# Patient Record
Sex: FEMALE | Race: AMERICAN INDIAN OR ALASKA NATIVE | NOT HISPANIC OR LATINO | Employment: OTHER | URBAN - METROPOLITAN AREA
[De-identification: names, ages, dates, MRNs, and addresses within clinical notes are randomized per-mention and may not be internally consistent; named-entity substitution may affect disease eponyms.]

---

## 2017-01-01 ENCOUNTER — GENERIC CONVERSION - ENCOUNTER (OUTPATIENT)
Dept: OTHER | Facility: OTHER | Age: 77
End: 2017-01-01

## 2017-01-01 ENCOUNTER — APPOINTMENT (OUTPATIENT)
Dept: LAB | Facility: CLINIC | Age: 77
End: 2017-01-01
Payer: MEDICARE

## 2017-01-01 ENCOUNTER — ALLSCRIPTS OFFICE VISIT (OUTPATIENT)
Dept: OTHER | Facility: OTHER | Age: 77
End: 2017-01-01

## 2017-01-01 ENCOUNTER — TRANSCRIBE ORDERS (OUTPATIENT)
Dept: ADMINISTRATIVE | Facility: HOSPITAL | Age: 77
End: 2017-01-01

## 2017-01-01 ENCOUNTER — HOSPITAL ENCOUNTER (OUTPATIENT)
Dept: MAMMOGRAPHY | Facility: CLINIC | Age: 77
Discharge: HOME | End: 2017-08-28
Payer: MEDICARE

## 2017-01-01 ENCOUNTER — HOSPITAL ENCOUNTER (EMERGENCY)
Facility: HOSPITAL | Age: 77
Discharge: HOME | End: 2017-09-11
Attending: EMERGENCY MEDICINE | Admitting: EMERGENCY MEDICINE
Payer: MEDICARE

## 2017-01-01 ENCOUNTER — HOSPITAL ENCOUNTER (OUTPATIENT)
Dept: MAMMOGRAPHY | Facility: CLINIC | Age: 77
Discharge: HOME | End: 2017-10-02
Payer: MEDICARE

## 2017-01-01 VITALS
WEIGHT: 288.14 LBS | SYSTOLIC BLOOD PRESSURE: 141 MMHG | TEMPERATURE: 97.9 F | DIASTOLIC BLOOD PRESSURE: 74 MMHG | RESPIRATION RATE: 16 BRPM | BODY MASS INDEX: 46.31 KG/M2 | HEART RATE: 54 BPM | OXYGEN SATURATION: 98 % | HEIGHT: 66 IN

## 2017-01-01 DIAGNOSIS — I10 ESSENTIAL (PRIMARY) HYPERTENSION: ICD-10-CM

## 2017-01-01 DIAGNOSIS — R74.8 ABNORMAL LEVELS OF OTHER SERUM ENZYMES: ICD-10-CM

## 2017-01-01 DIAGNOSIS — Z12.31 ENCOUNTER FOR SCREENING MAMMOGRAM FOR MALIGNANT NEOPLASM OF BREAST: ICD-10-CM

## 2017-01-01 DIAGNOSIS — R21 RASH AND NONSPECIFIC SKIN ERUPTION: Primary | ICD-10-CM

## 2017-01-01 DIAGNOSIS — E78.5 HYPERLIPIDEMIA: ICD-10-CM

## 2017-01-01 DIAGNOSIS — Z12.31 ENCOUNTER FOR MAMMOGRAM TO ESTABLISH BASELINE MAMMOGRAM: ICD-10-CM

## 2017-01-01 DIAGNOSIS — Z12.31 ENCOUNTER FOR MAMMOGRAM TO ESTABLISH BASELINE MAMMOGRAM: Primary | ICD-10-CM

## 2017-01-01 DIAGNOSIS — L23.7 POISON IVY DERMATITIS: ICD-10-CM

## 2017-01-01 LAB
ALBUMIN SERPL BCP-MCNC: 3.5 G/DL (ref 3.5–5)
ALP SERPL-CCNC: 96 U/L (ref 46–116)
ALT SERPL W P-5'-P-CCNC: 24 U/L (ref 12–78)
ANION GAP SERPL CALCULATED.3IONS-SCNC: 7 MMOL/L (ref 4–13)
ANION GAP SERPL CALCULATED.3IONS-SCNC: 7 MMOL/L (ref 4–13)
AST SERPL W P-5'-P-CCNC: 24 U/L (ref 5–45)
BILIRUB SERPL-MCNC: 0.66 MG/DL (ref 0.2–1)
BUN SERPL-MCNC: 29 MG/DL (ref 5–25)
BUN SERPL-MCNC: 38 MG/DL (ref 5–25)
CALCIUM SERPL-MCNC: 8.9 MG/DL (ref 8.3–10.1)
CALCIUM SERPL-MCNC: 8.9 MG/DL (ref 8.3–10.1)
CHLORIDE SERPL-SCNC: 105 MMOL/L (ref 100–108)
CHLORIDE SERPL-SCNC: 106 MMOL/L (ref 100–108)
CHOLEST SERPL-MCNC: 155 MG/DL (ref 50–200)
CK MB SERPL-MCNC: 2.4 NG/ML (ref 0–5)
CK MB SERPL-MCNC: <1 % (ref 0–2.5)
CK SERPL-CCNC: 288 U/L (ref 26–192)
CO2 SERPL-SCNC: 27 MMOL/L (ref 21–32)
CO2 SERPL-SCNC: 28 MMOL/L (ref 21–32)
CREAT SERPL-MCNC: 1.28 MG/DL (ref 0.6–1.3)
CREAT SERPL-MCNC: 1.3 MG/DL (ref 0.6–1.3)
GFR SERPL CREATININE-BSD FRML MDRD: 40 ML/MIN/1.73SQ M
GFR SERPL CREATININE-BSD FRML MDRD: 41 ML/MIN/1.73SQ M
GLUCOSE P FAST SERPL-MCNC: 91 MG/DL (ref 65–99)
GLUCOSE P FAST SERPL-MCNC: 97 MG/DL (ref 65–99)
HDLC SERPL-MCNC: 63 MG/DL (ref 40–60)
LDLC SERPL CALC-MCNC: 84 MG/DL (ref 0–100)
POTASSIUM SERPL-SCNC: 3.8 MMOL/L (ref 3.5–5.3)
POTASSIUM SERPL-SCNC: 4.2 MMOL/L (ref 3.5–5.3)
PROT SERPL-MCNC: 7.5 G/DL (ref 6.4–8.2)
SODIUM SERPL-SCNC: 139 MMOL/L (ref 136–145)
SODIUM SERPL-SCNC: 141 MMOL/L (ref 136–145)
TRIGL SERPL-MCNC: 40 MG/DL

## 2017-01-01 PROCEDURE — 80053 COMPREHEN METABOLIC PANEL: CPT

## 2017-01-01 PROCEDURE — 77063 BREAST TOMOSYNTHESIS BI: CPT

## 2017-01-01 PROCEDURE — 82550 ASSAY OF CK (CPK): CPT

## 2017-01-01 PROCEDURE — 99282 EMERGENCY DEPT VISIT SF MDM: CPT

## 2017-01-01 PROCEDURE — 80048 BASIC METABOLIC PNL TOTAL CA: CPT

## 2017-01-01 PROCEDURE — 80061 LIPID PANEL: CPT

## 2017-01-01 PROCEDURE — 77080 DXA BONE DENSITY AXIAL: CPT

## 2017-01-01 PROCEDURE — G0202 SCR MAMMO BI INCL CAD: HCPCS

## 2017-01-01 PROCEDURE — 36415 COLL VENOUS BLD VENIPUNCTURE: CPT

## 2017-01-01 PROCEDURE — 82553 CREATINE MB FRACTION: CPT

## 2017-01-01 RX ORDER — ASPIRIN 81 MG/1
81 TABLET ORAL DAILY
COMMUNITY

## 2017-01-01 RX ORDER — CHLORAL HYDRATE 500 MG
1000 CAPSULE ORAL 2 TIMES DAILY
COMMUNITY

## 2017-01-01 RX ORDER — FUROSEMIDE 40 MG/1
40 TABLET ORAL 2 TIMES DAILY
COMMUNITY
End: 2018-01-01 | Stop reason: SDUPTHER

## 2017-01-01 RX ORDER — METOPROLOL SUCCINATE 100 MG/1
100 TABLET, EXTENDED RELEASE ORAL DAILY
COMMUNITY
End: 2018-01-01 | Stop reason: SDUPTHER

## 2017-01-01 RX ORDER — MULTIVITAMIN
1 TABLET ORAL DAILY
COMMUNITY

## 2017-01-01 RX ORDER — MELATONIN
1000 DAILY
COMMUNITY

## 2017-01-01 RX ORDER — VALSARTAN AND HYDROCHLOROTHIAZIDE 80; 12.5 MG/1; MG/1
1 TABLET, FILM COATED ORAL DAILY
COMMUNITY

## 2017-01-01 RX ORDER — ATORVASTATIN CALCIUM 40 MG/1
20 TABLET, FILM COATED ORAL DAILY
COMMUNITY
End: 2018-01-01 | Stop reason: DRUGHIGH

## 2017-02-03 ENCOUNTER — GENERIC CONVERSION - ENCOUNTER (OUTPATIENT)
Dept: OTHER | Facility: OTHER | Age: 77
End: 2017-02-03

## 2017-02-10 ENCOUNTER — ALLSCRIPTS OFFICE VISIT (OUTPATIENT)
Dept: OTHER | Facility: OTHER | Age: 77
End: 2017-02-10

## 2017-02-23 ENCOUNTER — ALLSCRIPTS OFFICE VISIT (OUTPATIENT)
Dept: OTHER | Facility: OTHER | Age: 77
End: 2017-02-23

## 2017-04-12 ENCOUNTER — ALLSCRIPTS OFFICE VISIT (OUTPATIENT)
Dept: OTHER | Facility: OTHER | Age: 77
End: 2017-04-12

## 2017-05-15 ENCOUNTER — GENERIC CONVERSION - ENCOUNTER (OUTPATIENT)
Dept: OTHER | Facility: OTHER | Age: 77
End: 2017-05-15

## 2017-05-25 ENCOUNTER — APPOINTMENT (OUTPATIENT)
Dept: LAB | Facility: CLINIC | Age: 77
End: 2017-05-25
Payer: MEDICARE

## 2017-05-25 ENCOUNTER — GENERIC CONVERSION - ENCOUNTER (OUTPATIENT)
Dept: OTHER | Facility: OTHER | Age: 77
End: 2017-05-25

## 2017-05-25 DIAGNOSIS — E78.5 HYPERLIPIDEMIA: ICD-10-CM

## 2017-05-25 DIAGNOSIS — I10 ESSENTIAL (PRIMARY) HYPERTENSION: ICD-10-CM

## 2017-05-25 LAB
ALBUMIN SERPL BCP-MCNC: 3.4 G/DL (ref 3.5–5)
ALP SERPL-CCNC: 96 U/L (ref 46–116)
ALT SERPL W P-5'-P-CCNC: 27 U/L (ref 12–78)
ANION GAP SERPL CALCULATED.3IONS-SCNC: 5 MMOL/L (ref 4–13)
AST SERPL W P-5'-P-CCNC: 22 U/L (ref 5–45)
BILIRUB SERPL-MCNC: 0.57 MG/DL (ref 0.2–1)
BUN SERPL-MCNC: 26 MG/DL (ref 5–25)
CALCIUM SERPL-MCNC: 8.8 MG/DL (ref 8.3–10.1)
CHLORIDE SERPL-SCNC: 105 MMOL/L (ref 100–108)
CHOLEST SERPL-MCNC: 254 MG/DL (ref 50–200)
CK MB SERPL-MCNC: 2 NG/ML (ref 0–5)
CK MB SERPL-MCNC: <1 % (ref 0–2.5)
CK SERPL-CCNC: 256 U/L (ref 26–192)
CO2 SERPL-SCNC: 30 MMOL/L (ref 21–32)
CREAT SERPL-MCNC: 1.17 MG/DL (ref 0.6–1.3)
GFR SERPL CREATININE-BSD FRML MDRD: 45.1 ML/MIN/1.73SQ M
GLUCOSE P FAST SERPL-MCNC: 96 MG/DL (ref 65–99)
HDLC SERPL-MCNC: 69 MG/DL (ref 40–60)
LDLC SERPL CALC-MCNC: 172 MG/DL (ref 0–100)
POTASSIUM SERPL-SCNC: 3.9 MMOL/L (ref 3.5–5.3)
PROT SERPL-MCNC: 7.5 G/DL (ref 6.4–8.2)
SODIUM SERPL-SCNC: 140 MMOL/L (ref 136–145)
TRIGL SERPL-MCNC: 66 MG/DL

## 2017-05-25 PROCEDURE — 82553 CREATINE MB FRACTION: CPT

## 2017-05-25 PROCEDURE — 82550 ASSAY OF CK (CPK): CPT

## 2017-05-25 PROCEDURE — 36415 COLL VENOUS BLD VENIPUNCTURE: CPT

## 2017-05-25 PROCEDURE — 80053 COMPREHEN METABOLIC PANEL: CPT

## 2017-05-25 PROCEDURE — 80061 LIPID PANEL: CPT

## 2017-06-12 ENCOUNTER — ALLSCRIPTS OFFICE VISIT (OUTPATIENT)
Dept: OTHER | Facility: OTHER | Age: 77
End: 2017-06-12

## 2017-06-12 DIAGNOSIS — Z13.820 ENCOUNTER FOR SCREENING FOR OSTEOPOROSIS: ICD-10-CM

## 2017-06-15 ENCOUNTER — GENERIC CONVERSION - ENCOUNTER (OUTPATIENT)
Dept: OTHER | Facility: OTHER | Age: 77
End: 2017-06-15

## 2017-06-23 ENCOUNTER — ALLSCRIPTS OFFICE VISIT (OUTPATIENT)
Dept: OTHER | Facility: OTHER | Age: 77
End: 2017-06-23

## 2017-09-11 NOTE — DISCHARGE INSTRUCTIONS
Apply the medicated cream as needed  Continue to use the apple cider vinegar as it helps  Take Benadryl as needed for itching, as per the instructions  Try not to scratch at the rash  Follow up with your primary care doctor for further evaluation  Contact Dermatitis   WHAT YOU NEED TO KNOW:   Contact dermatitis is a skin rash  It develops when you touch something that irritates your skin or causes an allergic reaction  DISCHARGE INSTRUCTIONS:   Call 911 for any of the following:   · You have sudden trouble breathing  · Your throat swells and you have trouble eating  · Your face is swollen  Contact your healthcare provider if:   · You have a fever  · Your blisters are draining pus  · Your rash spreads or does not get better, even after treatment  · You have questions or concerns about your condition or care  Medicines:   · Medicines  help decrease itching and swelling  They will be given as a topical medicine to apply to your rash or as a pill  · Take your medicine as directed  Contact your healthcare provider if you think your medicine is not helping or if you have side effects  Tell him or her if you are allergic to any medicine  Keep a list of the medicines, vitamins, and herbs you take  Include the amounts, and when and why you take them  Bring the list or the pill bottles to follow-up visits  Carry your medicine list with you in case of an emergency  Manage contact dermatitis:   · Take short baths or showers in cool water  Use mild soap or soap-free cleansers  Add oatmeal, baking soda, or cornstarch to the bath water to help decrease skin irritation  · Avoid skin irritants , such as makeup, hair products, soaps, and cleansers  Use products that do not contain perfume or dye  · Apply a cool compress to your rash  This will help soothe your skin  · Keep your skin moist   Rub unscented cream or lotion on your skin to prevent dryness and itching   Do this right after a bath or shower when your skin is still damp  Follow up with your healthcare provider or dermatologist in 2 to 3 days:  Write down your questions so you remember to ask them during your visits  © 2017 [address] [employer] Information is for End User's use only and may not be sold, redistributed or otherwise used for commercial purposes  All illustrations and images included in CareNotes® are the copyrighted property of [employer]  or [employer]  The above information is an  only  It is not intended as medical advice for individual conditions or treatments  Talk to your doctor, nurse or pharmacist before following any medical regimen to see if it is safe and effective for you  Poison Ivy   WHAT YOU NEED TO KNOW:   Poison ivy is a plant that can cause an itchy, uncomfortable rash on your skin  Poison ivy grows as a shrub or vine in woods, fields, and areas of thick [place]  It has 3 bright green leaves on each stem that turn red in lill[place]  DISCHARGE INSTRUCTIONS:   Medicines:   · Antiseptic or drying creams or ointments: These medicines may be used to dry out the rash and decrease the itching  These products may be available without a doctor's order  · Steroids: This medicine helps decrease itching and inflammation  It can be given as a cream to apply to your skin or as a pill  · Antihistamines: This medicine may help decrease itching and help you sleep  It is available without a doctor's order  · Take your medicine as directed  Contact your healthcare provider if you think your medicine is not helping or if you have side effects  Tell him of her if you are allergic to any medicine  Keep a list of the medicines, vitamins, and herbs you take  Include the amounts, and when and why you take them  Bring the list or the pill bottles to follow-up visits  Carry your medicine list with you in case of an emergency    Follow up with your healthcare provider as directed:  Write down your questions so you remember to ask them during your visits  How your poison ivy rash spreads: You cannot spread poison ivy by touching your rash or the liquid from your blisters  Poison ivy is spread only if you scratch your skin while it still has oil on it  You may think your rash is spreading because new rashes appear over a number of days  This happens because areas covered by thin skin break out in a rash first  Your face or forearms may develop a rash before thicker areas, such as the palms of your hands  Self-care:   · Keep your rash clean and dry:  Wash it with soap and water  Gently pat it dry with a clean towel  · Try not to scratch or rub your rash: This can cause your skin to become infected  · Use a compress on your rash:  Dip a clean washcloth in cool water  Wring it out and place it on your rash  Leave the washcloth on your skin for 15 minutes  Do this at least 3 times per day  · Take a cornstarch or oatmeal bath: If your rash is too large to cover with wet washcloths, take 3 or 4 cornstarch baths daily  Mix 1 pound of cornstarch with a little water to make a paste  Add the paste to a tub full of water and mix well  You may also use colloidal oatmeal in the bath water  Use lukewarm water  Avoid hot water because it may cause your itching to increase  Prevent a poison ivy rash in the future:   · Wear skin protection:  Wear long pants, a long-sleeved shirt, and gloves  Use a skin block lotion to protect your skin from poison ivy oil  You can find this at a drugstore without a prescription  · Wash clothing after possible exposure: If you think you have been near a poison ivy plant, wash the clothes you were wearing separately from other clothes  Rinse the washing machine well after you take the clothes out  Scrub boots and shoes with warm, soapy water  Dry clean items and clothing that you cannot wash in water   Poison ivy oil is sticky and can stay on surfaces for a long time  It can cause a new rash even years later  · Bathe your pet:  Use warm water and shampoo on your pet's fur  This will prevent the spread of oil to your skin, car, and home  Wear long sleeves, long pants, and gloves while washing pets or any items that may have oil on them  · Reduce exposure to poison ivy:  Do not touch plants that look like poison ivy  Keep your yard free of poison ivy  While protecting your skin, remove the plant and the roots  Place them in a plastic bag and seal the bag tightly  · Do not burn poison ivy plants: This can spread the oil through the air  If you breathe the oil into your lungs, you could have swelling and serious breathing problems  Oil that clings to the fire rigoberto can land on your skin and cause a rash  Contact your healthcare provider if:   · You have pus, soft yellow scabs, or tenderness on the rash  · The itching gets worse or keeps you awake at night  · The rash covers more than 1/4 of your skin or spreads to your eyes, mouth, or genital area  · The rash is not better after 2 to 3 weeks  · You have tender, swollen glands on the sides of your neck  · You have swelling in your arms and legs  · You have questions or concerns about your condition or care  Return to the emergency department if:   · You have a fever  · You have redness, swelling, and tenderness around the rash  · You have trouble breathing  © 2017 [address] [employer]  Information is for End User's use only and may not be sold, redistributed or otherwise used for commercial purposes  All illustrations and images included in CareNotes® are the copyrighted property of [employer]  or [employer]cial Kim  The above information is an  only  It is not intended as medical advice for individual conditions or treatments   Talk to your doctor, nurse or pharmacist before following any medical regimen to see if it is safe and effective for you

## 2017-09-11 NOTE — ED PROVIDER NOTES
History  Chief Complaint   Patient presents with    Rash     red itchy rash on arms and abd   noticed first on sat nite  seems to be spreading  was at a picnic on sat     HPI    Prior to Admission Medications   Prescriptions Last Dose Informant Patient Reported? Taking? Multiple Vitamin (MULTIVITAMIN) tablet 9/10/2017 at Unknown time  Yes Yes   Sig: Take 1 tablet by mouth daily   Omega-3 Fatty Acids (FISH OIL) 1,000 mg 9/10/2017 at Unknown time  Yes Yes   Sig: Take 1,000 mg by mouth 2 (two) times a day   aspirin (ECOTRIN LOW STRENGTH) 81 mg EC tablet 9/10/2017 at Unknown time  Yes Yes   Sig: Take 81 mg by mouth daily   atorvastatin (LIPITOR) 40 mg tablet 9/10/2017 at Unknown time  Yes Yes   Sig: Take 40 mg by mouth daily   cholecalciferol (VITAMIN D3) 1,000 units tablet 9/10/2017 at Unknown time  Yes Yes   Sig: Take 1,000 Units by mouth daily   furosemide (LASIX) 40 mg tablet 9/10/2017 at Unknown time  Yes Yes   Sig: Take 40 mg by mouth 2 (two) times a day   metoprolol succinate (TOPROL-XL) 100 mg 24 hr tablet 9/10/2017 at Unknown time  Yes Yes   Sig: Take 100 mg by mouth daily   valsartan-hydrochlorothiazide (DIOVAN HCT) 80-12 5 MG per tablet 9/10/2017 at Unknown time  Yes Yes   Sig: Take 1 tablet by mouth daily      Facility-Administered Medications: None       Past Medical History:   Diagnosis Date    Hyperlipidemia     Hypertension        Past Surgical History:   Procedure Laterality Date    BACK SURGERY         History reviewed  No pertinent family history  I have reviewed and agree with the history as documented      Social History   Substance Use Topics    Smoking status: Never Smoker    Smokeless tobacco: Never Used    Alcohol use No        Review of Systems    Physical Exam  ED Triage Vitals [09/11/17 1039]   Temperature Pulse Respirations Blood Pressure SpO2   97 9 °F (36 6 °C) (!) 54 16 141/74 98 %      Temp Source Heart Rate Source Patient Position BP Location FiO2 (%)   Oral Monitor Lying Right arm --      Pain Score       No Pain           Physical Exam   Constitutional: She is oriented to person, place, and time  She appears well-developed and well-nourished  No distress  obese   HENT:   Head: Normocephalic and atraumatic  Eyes: Conjunctivae are normal  Pupils are equal, round, and reactive to light  Neck: Normal range of motion  No tracheal deviation present  Cardiovascular: Normal rate, regular rhythm and intact distal pulses  Pulmonary/Chest: Effort normal  No respiratory distress  Neurological: She is alert and oriented to person, place, and time  Skin: Skin is warm and dry  Rash noted  Rash is papular  Rash is not nodular, not pustular, not vesicular and not urticarial         Psychiatric: She has a normal mood and affect  Her behavior is normal    Nursing note and vitals reviewed  ED Medications  Medications - No data to display    Diagnostic Studies  Labs Reviewed - No data to display    No orders to display       Procedures  Procedures      Phone Contacts  ED Phone Contact    ED Course  ED Course                                MDM  Number of Diagnoses or Management Options  Poison ivy dermatitis: new and does not require workup  Rash and nonspecific skin eruption: new and does not require workup  Diagnosis management comments: This is a 60-year-old female who presents with a rash  On 09/09 she was outdoors at a picnic, and later that evening noticed a few spots just distal to the flexor surface of her left elbow  The rash has since spread up her left arm, and to several areas by her left upper lateral breast   She has had a few scattered areas across her abdomen  She denies any pain, but states it is itchy  She has used rubbing alcohol as well as apple cider vinegar to the areas which has provided some relief to the itching  She denies any known exposure to poison ivy, though does state she was outside  She denies exposure to similar rash    She denies any other allergic symptoms  She denies any new exposures, medications, personal care products  She denies any recent allergic symptoms  ROS: Otherwise negative, unless stated as above  She is well-appearing, in no acute distress  She has scattered papules in a linear distribution of the flexor surface of her left arm and a few on her left upper lateral breast   She also does have a few scattered papules across her abdomen  There is no other signs of rash  The remainder of her exam is unremarkable  This is most likely poison ivy or other contact dermatitis  I am not concerned about shingles or scabies at this time  I discussed with the patient findings, treatment at home, follow-up, and indications for return, and she expresses understanding with this plan  CritCare Time    Disposition  Final diagnoses:   Rash and nonspecific skin eruption   Poison ivy dermatitis     ED Disposition     ED Disposition Condition Comment    Discharge  Pao Humphries discharge to home/self care  Condition at discharge: Good        Follow-up Information     Follow up With Specialties Details Why Contact Info    Lorrie Portillo MD Internal Medicine Schedule an appointment as soon as possible for a visit in 3 days to follow up on your rash [id number] [facility] [address]  [phone]          Patient's Medications   Discharge Prescriptions    HYDROCORTISONE 2 5 % CREAM    Apply topically 4 (four) times a day as needed (itching)       Start Date: 9/11/2017 End Date: --       Order Dose: --       Quantity: 30 g    Refills: 0     No discharge procedures on file      ED Provider  Electronically Signed by       Kaley Daly MD  09/11/17 8667
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
## 2018-01-01 ENCOUNTER — LAB (OUTPATIENT)
Dept: LAB | Facility: CLINIC | Age: 78
End: 2018-01-01
Payer: MEDICARE

## 2018-01-01 ENCOUNTER — APPOINTMENT (INPATIENT)
Dept: RADIOLOGY | Facility: HOSPITAL | Age: 78
DRG: 283 | End: 2018-01-01
Payer: MEDICARE

## 2018-01-01 ENCOUNTER — APPOINTMENT (EMERGENCY)
Dept: RADIOLOGY | Facility: HOSPITAL | Age: 78
DRG: 283 | End: 2018-01-01
Payer: MEDICARE

## 2018-01-01 ENCOUNTER — OFFICE VISIT (OUTPATIENT)
Dept: CARDIOLOGY CLINIC | Facility: CLINIC | Age: 78
End: 2018-01-01
Payer: MEDICARE

## 2018-01-01 ENCOUNTER — OFFICE VISIT (OUTPATIENT)
Dept: INTERNAL MEDICINE CLINIC | Facility: CLINIC | Age: 78
End: 2018-01-01
Payer: MEDICARE

## 2018-01-01 ENCOUNTER — HOSPITAL ENCOUNTER (INPATIENT)
Facility: HOSPITAL | Age: 78
LOS: 2 days | DRG: 283 | End: 2018-07-08
Attending: EMERGENCY MEDICINE | Admitting: INTERNAL MEDICINE
Payer: MEDICARE

## 2018-01-01 ENCOUNTER — TELEPHONE (OUTPATIENT)
Dept: CARDIOLOGY CLINIC | Facility: CLINIC | Age: 78
End: 2018-01-01

## 2018-01-01 ENCOUNTER — GENERIC CONVERSION - ENCOUNTER (OUTPATIENT)
Dept: INTERNAL MEDICINE CLINIC | Facility: CLINIC | Age: 78
End: 2018-01-01

## 2018-01-01 ENCOUNTER — TELEPHONE (OUTPATIENT)
Dept: INTERNAL MEDICINE CLINIC | Facility: CLINIC | Age: 78
End: 2018-01-01

## 2018-01-01 ENCOUNTER — APPOINTMENT (INPATIENT)
Dept: NON INVASIVE DIAGNOSTICS | Facility: HOSPITAL | Age: 78
DRG: 283 | End: 2018-01-01
Payer: MEDICARE

## 2018-01-01 ENCOUNTER — HOSPITAL ENCOUNTER (OUTPATIENT)
Dept: NON INVASIVE DIAGNOSTICS | Facility: CLINIC | Age: 78
Discharge: HOME | End: 2018-04-05
Payer: MEDICARE

## 2018-01-01 ENCOUNTER — APPOINTMENT (INPATIENT)
Dept: CT IMAGING | Facility: HOSPITAL | Age: 78
DRG: 283 | End: 2018-01-01
Payer: MEDICARE

## 2018-01-01 VITALS
HEART RATE: 80 BPM | WEIGHT: 281.13 LBS | BODY MASS INDEX: 48 KG/M2 | SYSTOLIC BLOOD PRESSURE: 122 MMHG | HEIGHT: 64 IN | DIASTOLIC BLOOD PRESSURE: 80 MMHG

## 2018-01-01 VITALS
HEIGHT: 64 IN | OXYGEN SATURATION: 99 % | HEART RATE: 82 BPM | BODY MASS INDEX: 48.36 KG/M2 | SYSTOLIC BLOOD PRESSURE: 128 MMHG | WEIGHT: 283.25 LBS | DIASTOLIC BLOOD PRESSURE: 64 MMHG

## 2018-01-01 VITALS
DIASTOLIC BLOOD PRESSURE: 43 MMHG | OXYGEN SATURATION: 91 % | SYSTOLIC BLOOD PRESSURE: 68 MMHG | TEMPERATURE: 100.4 F | HEIGHT: 66 IN | WEIGHT: 277.78 LBS | BODY MASS INDEX: 44.64 KG/M2

## 2018-01-01 VITALS
TEMPERATURE: 98 F | HEART RATE: 70 BPM | HEIGHT: 64 IN | WEIGHT: 284 LBS | DIASTOLIC BLOOD PRESSURE: 66 MMHG | SYSTOLIC BLOOD PRESSURE: 112 MMHG | BODY MASS INDEX: 48.49 KG/M2 | OXYGEN SATURATION: 98 %

## 2018-01-01 VITALS
HEART RATE: 87 BPM | DIASTOLIC BLOOD PRESSURE: 74 MMHG | SYSTOLIC BLOOD PRESSURE: 128 MMHG | HEIGHT: 66 IN | WEIGHT: 280.4 LBS | OXYGEN SATURATION: 97 % | BODY MASS INDEX: 45.06 KG/M2

## 2018-01-01 VITALS
TEMPERATURE: 98 F | BODY MASS INDEX: 47.74 KG/M2 | SYSTOLIC BLOOD PRESSURE: 112 MMHG | DIASTOLIC BLOOD PRESSURE: 74 MMHG | WEIGHT: 278.13 LBS | OXYGEN SATURATION: 97 % | HEART RATE: 73 BPM

## 2018-01-01 VITALS
HEART RATE: 68 BPM | HEIGHT: 64 IN | DIASTOLIC BLOOD PRESSURE: 72 MMHG | BODY MASS INDEX: 49.68 KG/M2 | SYSTOLIC BLOOD PRESSURE: 122 MMHG | WEIGHT: 291 LBS

## 2018-01-01 VITALS
WEIGHT: 275.5 LBS | BODY MASS INDEX: 47.03 KG/M2 | HEIGHT: 64 IN | SYSTOLIC BLOOD PRESSURE: 118 MMHG | HEART RATE: 60 BPM | DIASTOLIC BLOOD PRESSURE: 74 MMHG

## 2018-01-01 VITALS
HEART RATE: 82 BPM | BODY MASS INDEX: 48.17 KG/M2 | SYSTOLIC BLOOD PRESSURE: 120 MMHG | TEMPERATURE: 98.3 F | DIASTOLIC BLOOD PRESSURE: 58 MMHG | WEIGHT: 282.13 LBS | OXYGEN SATURATION: 97 % | HEIGHT: 64 IN

## 2018-01-01 VITALS
WEIGHT: 280 LBS | DIASTOLIC BLOOD PRESSURE: 52 MMHG | OXYGEN SATURATION: 97 % | HEIGHT: 66 IN | SYSTOLIC BLOOD PRESSURE: 116 MMHG | HEART RATE: 56 BPM | BODY MASS INDEX: 45 KG/M2

## 2018-01-01 VITALS
RESPIRATION RATE: 16 BRPM | HEART RATE: 62 BPM | DIASTOLIC BLOOD PRESSURE: 70 MMHG | TEMPERATURE: 98.5 F | WEIGHT: 286.5 LBS | SYSTOLIC BLOOD PRESSURE: 121 MMHG | HEIGHT: 64 IN | BODY MASS INDEX: 48.91 KG/M2

## 2018-01-01 DIAGNOSIS — I21.4 NSTEMI (NON-ST ELEVATED MYOCARDIAL INFARCTION) (HCC): ICD-10-CM

## 2018-01-01 DIAGNOSIS — I10 ESSENTIAL HYPERTENSION: Primary | ICD-10-CM

## 2018-01-01 DIAGNOSIS — I10 ESSENTIAL (PRIMARY) HYPERTENSION: ICD-10-CM

## 2018-01-01 DIAGNOSIS — I10 ESSENTIAL HYPERTENSION: ICD-10-CM

## 2018-01-01 DIAGNOSIS — I27.20 MILD PULMONARY HYPERTENSION (HCC): ICD-10-CM

## 2018-01-01 DIAGNOSIS — D72.810 LYMPHOCYTOPENIA: ICD-10-CM

## 2018-01-01 DIAGNOSIS — E78.5 HYPERLIPIDEMIA: ICD-10-CM

## 2018-01-01 DIAGNOSIS — I46.9 CARDIAC ARREST (HCC): Primary | ICD-10-CM

## 2018-01-01 DIAGNOSIS — E78.2 MIXED HYPERLIPIDEMIA: ICD-10-CM

## 2018-01-01 DIAGNOSIS — R60.0 LOWER EXTREMITY EDEMA: ICD-10-CM

## 2018-01-01 DIAGNOSIS — I47.1 PAROXYSMAL SUPRAVENTRICULAR TACHYCARDIA (HCC): Primary | ICD-10-CM

## 2018-01-01 DIAGNOSIS — N17.9 ACUTE KIDNEY INJURY (HCC): ICD-10-CM

## 2018-01-01 DIAGNOSIS — E78.5 HYPERLIPIDEMIA, UNSPECIFIED HYPERLIPIDEMIA TYPE: Primary | ICD-10-CM

## 2018-01-01 DIAGNOSIS — R60.0 LOWER EXTREMITY EDEMA: Primary | ICD-10-CM

## 2018-01-01 DIAGNOSIS — M25.562 LEFT KNEE PAIN, UNSPECIFIED CHRONICITY: ICD-10-CM

## 2018-01-01 DIAGNOSIS — I51.7 LEFT VENTRICULAR HYPERTROPHY: ICD-10-CM

## 2018-01-01 DIAGNOSIS — M25.562 LEFT KNEE PAIN, UNSPECIFIED CHRONICITY: Primary | ICD-10-CM

## 2018-01-01 DIAGNOSIS — I50.9 CONGESTIVE HEART FAILURE (CHF) (HCC): ICD-10-CM

## 2018-01-01 LAB
ALBUMIN SERPL BCP-MCNC: 2.9 G/DL (ref 3.5–5)
ALBUMIN SERPL BCP-MCNC: 3.5 G/DL (ref 3.5–5)
ALBUMIN SERPL BCP-MCNC: 3.7 G/DL (ref 3.5–5)
ALP SERPL-CCNC: 103 U/L (ref 46–116)
ALP SERPL-CCNC: 109 U/L (ref 46–116)
ALP SERPL-CCNC: 99 U/L (ref 46–116)
ALT SERPL W P-5'-P-CCNC: 217 U/L (ref 12–78)
ALT SERPL W P-5'-P-CCNC: 27 U/L (ref 12–78)
ALT SERPL W P-5'-P-CCNC: 32 U/L (ref 12–78)
AMPHETAMINES SERPL QL SCN: NEGATIVE
ANION GAP SERPL CALCULATED.3IONS-SCNC: 10 MMOL/L (ref 4–13)
ANION GAP SERPL CALCULATED.3IONS-SCNC: 12 MMOL/L (ref 4–13)
ANION GAP SERPL CALCULATED.3IONS-SCNC: 13 MMOL/L (ref 4–13)
ANION GAP SERPL CALCULATED.3IONS-SCNC: 16 MMOL/L (ref 4–13)
ANION GAP SERPL CALCULATED.3IONS-SCNC: 17 MMOL/L (ref 4–13)
ANION GAP SERPL CALCULATED.3IONS-SCNC: 19 MMOL/L (ref 4–13)
ANION GAP SERPL CALCULATED.3IONS-SCNC: 6 MMOL/L (ref 4–13)
ANISOCYTOSIS BLD QL SMEAR: PRESENT
APTT PPP: 43 SECONDS (ref 24–36)
AST SERPL W P-5'-P-CCNC: 23 U/L (ref 5–45)
AST SERPL W P-5'-P-CCNC: 232 U/L (ref 5–45)
AST SERPL W P-5'-P-CCNC: 29 U/L (ref 5–45)
ATRIAL RATE: 86 BPM
ATRIAL RATE: 92 BPM
BARBITURATES UR QL: NEGATIVE
BASE EXCESS BLDA CALC-SCNC: -16.6 MMOL/L
BASE EXCESS BLDA CALC-SCNC: -2.5 MMOL/L
BASE EXCESS BLDA CALC-SCNC: -3.2 MMOL/L
BASE EXCESS BLDA CALC-SCNC: -3.6 MMOL/L
BASOPHILS # BLD AUTO: 0.02 THOUSANDS/ΜL (ref 0–0.1)
BASOPHILS # BLD AUTO: 0.04 THOUSANDS/ΜL (ref 0–0.1)
BASOPHILS # BLD AUTO: 0.05 THOUSANDS/ΜL (ref 0–0.1)
BASOPHILS # BLD MANUAL: 0 THOUSAND/UL (ref 0–0.1)
BASOPHILS NFR BLD AUTO: 1 % (ref 0–1)
BASOPHILS NFR MAR MANUAL: 0 % (ref 0–1)
BENZODIAZ UR QL: NEGATIVE
BILIRUB DIRECT SERPL-MCNC: 0.16 MG/DL (ref 0–0.2)
BILIRUB SERPL-MCNC: 0.5 MG/DL (ref 0.2–1)
BILIRUB SERPL-MCNC: 0.6 MG/DL (ref 0.2–1)
BILIRUB SERPL-MCNC: 0.68 MG/DL (ref 0.2–1)
BILIRUB UR QL STRIP: NEGATIVE
BUN SERPL-MCNC: 26 MG/DL (ref 5–25)
BUN SERPL-MCNC: 47 MG/DL (ref 5–25)
BUN SERPL-MCNC: 47 MG/DL (ref 5–25)
BUN SERPL-MCNC: 49 MG/DL (ref 5–25)
BUN SERPL-MCNC: 52 MG/DL (ref 5–25)
CALCIUM SERPL-MCNC: 12 MG/DL (ref 8.3–10.1)
CALCIUM SERPL-MCNC: 8.5 MG/DL (ref 8.3–10.1)
CALCIUM SERPL-MCNC: 8.5 MG/DL (ref 8.3–10.1)
CALCIUM SERPL-MCNC: 8.7 MG/DL (ref 8.3–10.1)
CALCIUM SERPL-MCNC: 9 MG/DL (ref 8.3–10.1)
CHLORIDE SERPL-SCNC: 101 MMOL/L (ref 100–108)
CHLORIDE SERPL-SCNC: 102 MMOL/L (ref 100–108)
CHLORIDE SERPL-SCNC: 103 MMOL/L (ref 100–108)
CHLORIDE SERPL-SCNC: 104 MMOL/L (ref 100–108)
CHLORIDE SERPL-SCNC: 105 MMOL/L (ref 100–108)
CHOLEST SERPL-MCNC: 148 MG/DL (ref 50–200)
CK MB SERPL-MCNC: 2.8 NG/ML (ref 0–5)
CK MB SERPL-MCNC: <1 % (ref 0–2.5)
CK SERPL-CCNC: 303 U/L (ref 26–192)
CLARITY UR: CLEAR
CO2 SERPL-SCNC: 20 MMOL/L (ref 21–32)
CO2 SERPL-SCNC: 21 MMOL/L (ref 21–32)
CO2 SERPL-SCNC: 23 MMOL/L (ref 21–32)
CO2 SERPL-SCNC: 24 MMOL/L (ref 21–32)
CO2 SERPL-SCNC: 24 MMOL/L (ref 21–32)
CO2 SERPL-SCNC: 26 MMOL/L (ref 21–32)
CO2 SERPL-SCNC: 27 MMOL/L (ref 21–32)
COCAINE UR QL: NEGATIVE
COLOR UR: YELLOW
CREAT SERPL-MCNC: 1.18 MG/DL (ref 0.6–1.3)
CREAT SERPL-MCNC: 1.81 MG/DL (ref 0.6–1.3)
CREAT SERPL-MCNC: 1.87 MG/DL (ref 0.6–1.3)
CREAT SERPL-MCNC: 1.89 MG/DL (ref 0.6–1.3)
CREAT SERPL-MCNC: 2.21 MG/DL (ref 0.6–1.3)
CREAT SERPL-MCNC: 2.32 MG/DL (ref 0.6–1.3)
CREAT SERPL-MCNC: 2.7 MG/DL (ref 0.6–1.3)
CREAT UR-MCNC: 294 MG/DL
EOSINOPHIL # BLD AUTO: 0.12 THOUSAND/ΜL (ref 0–0.61)
EOSINOPHIL # BLD AUTO: 0.14 THOUSAND/ΜL (ref 0–0.61)
EOSINOPHIL # BLD AUTO: 0.24 THOUSAND/ΜL (ref 0–0.61)
EOSINOPHIL # BLD MANUAL: 0.32 THOUSAND/UL (ref 0–0.4)
EOSINOPHIL NFR BLD AUTO: 2 % (ref 0–6)
EOSINOPHIL NFR BLD AUTO: 2 % (ref 0–6)
EOSINOPHIL NFR BLD AUTO: 7 % (ref 0–6)
EOSINOPHIL NFR BLD MANUAL: 3 % (ref 0–6)
ERYTHROCYTE [DISTWIDTH] IN BLOOD BY AUTOMATED COUNT: 14.4 % (ref 11.6–15.1)
ERYTHROCYTE [DISTWIDTH] IN BLOOD BY AUTOMATED COUNT: 14.5 % (ref 11.6–15.1)
ERYTHROCYTE [DISTWIDTH] IN BLOOD BY AUTOMATED COUNT: 14.6 % (ref 11.6–15.1)
ERYTHROCYTE [DISTWIDTH] IN BLOOD BY AUTOMATED COUNT: 14.8 % (ref 11.6–15.1)
ETHANOL SERPL-MCNC: <3 MG/DL (ref 0–3)
GFR SERPL CREATININE-BSD FRML MDRD: 16 ML/MIN/1.73SQ M
GFR SERPL CREATININE-BSD FRML MDRD: 20 ML/MIN/1.73SQ M
GFR SERPL CREATININE-BSD FRML MDRD: 21 ML/MIN/1.73SQ M
GFR SERPL CREATININE-BSD FRML MDRD: 25 ML/MIN/1.73SQ M
GFR SERPL CREATININE-BSD FRML MDRD: 26 ML/MIN/1.73SQ M
GFR SERPL CREATININE-BSD FRML MDRD: 27 ML/MIN/1.73SQ M
GFR SERPL CREATININE-BSD FRML MDRD: 45 ML/MIN/1.73SQ M
GLUCOSE P FAST SERPL-MCNC: 92 MG/DL (ref 65–99)
GLUCOSE SERPL-MCNC: 101 MG/DL (ref 65–140)
GLUCOSE SERPL-MCNC: 118 MG/DL (ref 65–140)
GLUCOSE SERPL-MCNC: 122 MG/DL (ref 65–140)
GLUCOSE SERPL-MCNC: 188 MG/DL (ref 65–140)
GLUCOSE SERPL-MCNC: 199 MG/DL (ref 65–140)
GLUCOSE SERPL-MCNC: 345 MG/DL (ref 65–140)
GLUCOSE SERPL-MCNC: 379 MG/DL (ref 65–140)
GLUCOSE UR STRIP-MCNC: NEGATIVE MG/DL
HCO3 BLDA-SCNC: 16.7 MMOL/L (ref 22–28)
HCO3 BLDA-SCNC: 20.9 MMOL/L (ref 22–28)
HCO3 BLDA-SCNC: 22.6 MMOL/L (ref 22–28)
HCO3 BLDA-SCNC: 22.9 MMOL/L (ref 22–28)
HCT VFR BLD AUTO: 39.8 % (ref 34.8–46.1)
HCT VFR BLD AUTO: 39.9 % (ref 34.8–46.1)
HCT VFR BLD AUTO: 41.2 % (ref 34.8–46.1)
HCT VFR BLD AUTO: 41.2 % (ref 34.8–46.1)
HDLC SERPL-MCNC: 70 MG/DL (ref 40–60)
HGB BLD-MCNC: 11 G/DL (ref 11.5–15.4)
HGB BLD-MCNC: 12.6 G/DL (ref 11.5–15.4)
HGB BLD-MCNC: 13.1 G/DL (ref 11.5–15.4)
HGB BLD-MCNC: 13.1 G/DL (ref 11.5–15.4)
HGB BLD-MCNC: 13.2 G/DL (ref 11.5–15.4)
HGB UR QL STRIP.AUTO: NEGATIVE
HOROWITZ INDEX BLDA+IHG-RTO: 100 MM[HG]
IMM GRANULOCYTES # BLD AUTO: 0.03 THOUSAND/UL (ref 0–0.2)
IMM GRANULOCYTES # BLD AUTO: 0.06 THOUSAND/UL (ref 0–0.2)
IMM GRANULOCYTES NFR BLD AUTO: 0 % (ref 0–2)
IMM GRANULOCYTES NFR BLD AUTO: 1 % (ref 0–2)
INR PPP: 1.07 (ref 0.86–1.17)
INR PPP: 1.71 (ref 0.86–1.17)
KETONES UR STRIP-MCNC: NEGATIVE MG/DL
LACTATE SERPL-SCNC: 15 MMOL/L (ref 0.5–2)
LACTATE SERPL-SCNC: 6.8 MMOL/L (ref 0.5–2)
LACTATE SERPL-SCNC: 6.9 MMOL/L (ref 0.5–2)
LDLC SERPL CALC-MCNC: 69 MG/DL (ref 0–100)
LEUKOCYTE ESTERASE UR QL STRIP: NEGATIVE
LYMPHOCYTES # BLD AUTO: 1.18 THOUSANDS/ΜL (ref 0.6–4.47)
LYMPHOCYTES # BLD AUTO: 1.63 THOUSANDS/ΜL (ref 0.6–4.47)
LYMPHOCYTES # BLD AUTO: 1.86 THOUSANDS/ΜL (ref 0.6–4.47)
LYMPHOCYTES # BLD AUTO: 5.96 THOUSAND/UL (ref 0.6–4.47)
LYMPHOCYTES # BLD AUTO: 56 % (ref 14–44)
LYMPHOCYTES NFR BLD AUTO: 25 % (ref 14–44)
LYMPHOCYTES NFR BLD AUTO: 25 % (ref 14–44)
LYMPHOCYTES NFR BLD AUTO: 32 % (ref 14–44)
MCH RBC QN AUTO: 27.9 PG (ref 26.8–34.3)
MCH RBC QN AUTO: 28.3 PG (ref 26.8–34.3)
MCHC RBC AUTO-ENTMCNC: 30.6 G/DL (ref 31.4–37.4)
MCHC RBC AUTO-ENTMCNC: 32 G/DL (ref 31.4–37.4)
MCHC RBC AUTO-ENTMCNC: 32.8 G/DL (ref 31.4–37.4)
MCHC RBC AUTO-ENTMCNC: 32.9 G/DL (ref 31.4–37.4)
MCV RBC AUTO: 85 FL (ref 82–98)
MCV RBC AUTO: 86 FL (ref 82–98)
MCV RBC AUTO: 87 FL (ref 82–98)
MCV RBC AUTO: 91 FL (ref 82–98)
METAMYELOCYTES NFR BLD MANUAL: 1 % (ref 0–1)
METHADONE UR QL: NEGATIVE
MICROALBUMIN UR-MCNC: 56.6 MG/L (ref 0–20)
MICROALBUMIN/CREAT 24H UR: 19 MG/G CREATININE (ref 0–30)
MONOCYTES # BLD AUTO: 0.43 THOUSAND/UL (ref 0–1.22)
MONOCYTES # BLD AUTO: 0.54 THOUSAND/ΜL (ref 0.17–1.22)
MONOCYTES # BLD AUTO: 0.77 THOUSAND/ΜL (ref 0.17–1.22)
MONOCYTES # BLD AUTO: 0.86 THOUSAND/ΜL (ref 0.17–1.22)
MONOCYTES NFR BLD AUTO: 12 % (ref 4–12)
MONOCYTES NFR BLD AUTO: 12 % (ref 4–12)
MONOCYTES NFR BLD AUTO: 15 % (ref 4–12)
MONOCYTES NFR BLD: 4 % (ref 4–12)
MYELOCYTES NFR BLD MANUAL: 2 % (ref 0–1)
NEUTROPHILS # BLD AUTO: 1.71 THOUSANDS/ΜL (ref 1.85–7.62)
NEUTROPHILS # BLD AUTO: 4 THOUSANDS/ΜL (ref 1.85–7.62)
NEUTROPHILS # BLD AUTO: 4.51 THOUSANDS/ΜL (ref 1.85–7.62)
NEUTROPHILS # BLD MANUAL: 3.51 THOUSAND/UL (ref 1.85–7.62)
NEUTS BAND NFR BLD MANUAL: 3 % (ref 0–8)
NEUTS SEG NFR BLD AUTO: 30 % (ref 43–75)
NEUTS SEG NFR BLD AUTO: 45 % (ref 43–75)
NEUTS SEG NFR BLD AUTO: 59 % (ref 43–75)
NEUTS SEG NFR BLD AUTO: 60 % (ref 43–75)
NITRITE UR QL STRIP: NEGATIVE
NRBC BLD AUTO-RTO: 0 /100 WBCS
NT-PROBNP SERPL-MCNC: 5380 PG/ML
NT-PROBNP SERPL-MCNC: 5956 PG/ML
O2 CT BLDA-SCNC: 16.2 ML/DL (ref 16–23)
O2 CT BLDA-SCNC: 16.3 ML/DL (ref 16–23)
O2 CT BLDA-SCNC: 17.2 ML/DL (ref 16–23)
O2 CT BLDA-SCNC: 17.3 ML/DL (ref 16–23)
OPIATES UR QL SCN: NEGATIVE
OXYHGB MFR BLDA: 90.3 % (ref 94–97)
OXYHGB MFR BLDA: 95.5 % (ref 94–97)
OXYHGB MFR BLDA: 97 % (ref 94–97)
OXYHGB MFR BLDA: 97.2 % (ref 94–97)
P AXIS: 39 DEGREES
P AXIS: 68 DEGREES
PCO2 BLDA: 35.6 MM HG (ref 36–44)
PCO2 BLDA: 41.7 MM HG (ref 36–44)
PCO2 BLDA: 43.5 MM HG (ref 36–44)
PCO2 BLDA: 79.1 MM HG (ref 36–44)
PCP UR QL: NEGATIVE
PEEP RESPIRATORY: 5 CM[H2O]
PH BLDA: 6.94 [PH] (ref 7.35–7.45)
PH BLDA: 7.33 [PH] (ref 7.35–7.45)
PH BLDA: 7.36 [PH] (ref 7.35–7.45)
PH BLDA: 7.39 [PH] (ref 7.35–7.45)
PH UR STRIP.AUTO: 5.5 [PH] (ref 4.5–8)
PLATELET # BLD AUTO: 124 THOUSANDS/UL (ref 149–390)
PLATELET # BLD AUTO: 143 THOUSANDS/UL (ref 149–390)
PLATELET # BLD AUTO: 146 THOUSANDS/UL (ref 149–390)
PLATELET # BLD AUTO: 152 THOUSANDS/UL (ref 149–390)
PLATELET # BLD AUTO: 85 THOUSANDS/UL (ref 149–390)
PLATELET BLD QL SMEAR: ABNORMAL
PMV BLD AUTO: 12.1 FL (ref 8.9–12.7)
PMV BLD AUTO: 12.3 FL (ref 8.9–12.7)
PMV BLD AUTO: 12.6 FL (ref 8.9–12.7)
PMV BLD AUTO: 12.9 FL (ref 8.9–12.7)
PMV BLD AUTO: 14.1 FL (ref 8.9–12.7)
PO2 BLDA: 111.4 MM HG (ref 75–129)
PO2 BLDA: 122.4 MM HG (ref 75–129)
PO2 BLDA: 95.6 MM HG (ref 75–129)
PO2 BLDA: 97.3 MM HG (ref 75–129)
POTASSIUM SERPL-SCNC: 2.7 MMOL/L (ref 3.5–5.3)
POTASSIUM SERPL-SCNC: 3 MMOL/L (ref 3.5–5.3)
POTASSIUM SERPL-SCNC: 3.4 MMOL/L (ref 3.5–5.3)
POTASSIUM SERPL-SCNC: 3.5 MMOL/L (ref 3.5–5.3)
POTASSIUM SERPL-SCNC: 3.7 MMOL/L (ref 3.5–5.3)
POTASSIUM SERPL-SCNC: 3.9 MMOL/L (ref 3.5–5.3)
POTASSIUM SERPL-SCNC: 4.2 MMOL/L (ref 3.5–5.3)
PR INTERVAL: 174 MS
PR INTERVAL: 196 MS
PROCALCITONIN SERPL-MCNC: 4.45 NG/ML
PROT SERPL-MCNC: 6.6 G/DL (ref 6.4–8.2)
PROT SERPL-MCNC: 7.4 G/DL (ref 6.4–8.2)
PROT SERPL-MCNC: 7.9 G/DL (ref 6.4–8.2)
PROT UR STRIP-MCNC: NEGATIVE MG/DL
PROTHROMBIN TIME: 13.8 SECONDS (ref 11.8–14.2)
PROTHROMBIN TIME: 19.9 SECONDS (ref 11.8–14.2)
QRS AXIS: -43 DEGREES
QRS AXIS: -9 DEGREES
QRSD INTERVAL: 102 MS
QRSD INTERVAL: 142 MS
QT INTERVAL: 378 MS
QT INTERVAL: 428 MS
QTC INTERVAL: 452 MS
QTC INTERVAL: 529 MS
RBC # BLD AUTO: 4.51 MILLION/UL (ref 3.81–5.12)
RBC # BLD AUTO: 4.63 MILLION/UL (ref 3.81–5.12)
RBC # BLD AUTO: 4.69 MILLION/UL (ref 3.81–5.12)
RBC # BLD AUTO: 4.73 MILLION/UL (ref 3.81–5.12)
SODIUM 24H UR-SCNC: 12 MOL/L
SODIUM SERPL-SCNC: 138 MMOL/L (ref 136–145)
SODIUM SERPL-SCNC: 140 MMOL/L (ref 136–145)
SODIUM SERPL-SCNC: 141 MMOL/L (ref 136–145)
SODIUM SERPL-SCNC: 143 MMOL/L (ref 136–145)
SODIUM SERPL-SCNC: 143 MMOL/L (ref 136–145)
SP GR UR STRIP.AUTO: 1.02 (ref 1–1.03)
SPECIMEN SOURCE: ABNORMAL
SPECIMEN SOURCE: NORMAL
T WAVE AXIS: -29 DEGREES
T WAVE AXIS: -31 DEGREES
THC UR QL: NEGATIVE
TOTAL CELLS COUNTED SPEC: 100
TRIGL SERPL-MCNC: 46 MG/DL
TROPONIN I SERPL-MCNC: 0.24 NG/ML
TROPONIN I SERPL-MCNC: 0.29 NG/ML
TROPONIN I SERPL-MCNC: 0.29 NG/ML
TROPONIN I SERPL-MCNC: 1.07 NG/ML
TROPONIN I SERPL-MCNC: 2.51 NG/ML
TSH SERPL DL<=0.05 MIU/L-ACNC: 1.2 UIU/ML (ref 0.36–3.74)
UROBILINOGEN UR QL STRIP.AUTO: 0.2 E.U./DL
UUN 24H UR-MCNC: 990 MG/DL
VARIANT LYMPHS # BLD AUTO: 1 %
VENT AC: 12
VENT AC: 20
VENT- AC: AC
VENTRICULAR RATE: 86 BPM
VENTRICULAR RATE: 92 BPM
VT SETTING VENT: 450 ML
WBC # BLD AUTO: 10.64 THOUSAND/UL (ref 4.31–10.16)
WBC # BLD AUTO: 3.7 THOUSAND/UL (ref 4.31–10.16)
WBC # BLD AUTO: 6.62 THOUSAND/UL (ref 4.31–10.16)
WBC # BLD AUTO: 7.45 THOUSAND/UL (ref 4.31–10.16)

## 2018-01-01 PROCEDURE — 80320 DRUG SCREEN QUANTALCOHOLS: CPT | Performed by: NURSE PRACTITIONER

## 2018-01-01 PROCEDURE — 82805 BLOOD GASES W/O2 SATURATION: CPT | Performed by: ANESTHESIOLOGY

## 2018-01-01 PROCEDURE — 3E03317 INTRODUCTION OF OTHER THROMBOLYTIC INTO PERIPHERAL VEIN, PERCUTANEOUS APPROACH: ICD-10-PCS | Performed by: INTERNAL MEDICINE

## 2018-01-01 PROCEDURE — 86147 CARDIOLIPIN ANTIBODY EA IG: CPT | Performed by: NURSE PRACTITIONER

## 2018-01-01 PROCEDURE — 99232 SBSQ HOSP IP/OBS MODERATE 35: CPT | Performed by: INTERNAL MEDICINE

## 2018-01-01 PROCEDURE — 96374 THER/PROPH/DIAG INJ IV PUSH: CPT

## 2018-01-01 PROCEDURE — 80048 BASIC METABOLIC PNL TOTAL CA: CPT | Performed by: PHYSICIAN ASSISTANT

## 2018-01-01 PROCEDURE — 85705 THROMBOPLASTIN INHIBITION: CPT | Performed by: NURSE PRACTITIONER

## 2018-01-01 PROCEDURE — 82948 REAGENT STRIP/BLOOD GLUCOSE: CPT

## 2018-01-01 PROCEDURE — 82550 ASSAY OF CK (CPK): CPT

## 2018-01-01 PROCEDURE — 99233 SBSQ HOSP IP/OBS HIGH 50: CPT | Performed by: INTERNAL MEDICINE

## 2018-01-01 PROCEDURE — 93306 TTE W/DOPPLER COMPLETE: CPT

## 2018-01-01 PROCEDURE — 82043 UR ALBUMIN QUANTITATIVE: CPT | Performed by: INTERNAL MEDICINE

## 2018-01-01 PROCEDURE — 86146 BETA-2 GLYCOPROTEIN ANTIBODY: CPT | Performed by: NURSE PRACTITIONER

## 2018-01-01 PROCEDURE — 83880 ASSAY OF NATRIURETIC PEPTIDE: CPT | Performed by: EMERGENCY MEDICINE

## 2018-01-01 PROCEDURE — 85610 PROTHROMBIN TIME: CPT | Performed by: ANESTHESIOLOGY

## 2018-01-01 PROCEDURE — 82553 CREATINE MB FRACTION: CPT

## 2018-01-01 PROCEDURE — 85300 ANTITHROMBIN III ACTIVITY: CPT | Performed by: NURSE PRACTITIONER

## 2018-01-01 PROCEDURE — 5A12012 PERFORMANCE OF CARDIAC OUTPUT, SINGLE, MANUAL: ICD-10-PCS | Performed by: ANESTHESIOLOGY

## 2018-01-01 PROCEDURE — 84300 ASSAY OF URINE SODIUM: CPT | Performed by: INTERNAL MEDICINE

## 2018-01-01 PROCEDURE — 85670 THROMBIN TIME PLASMA: CPT | Performed by: NURSE PRACTITIONER

## 2018-01-01 PROCEDURE — 93308 TTE F-UP OR LMTD: CPT | Performed by: INTERNAL MEDICINE

## 2018-01-01 PROCEDURE — 83605 ASSAY OF LACTIC ACID: CPT | Performed by: NURSE PRACTITIONER

## 2018-01-01 PROCEDURE — 84443 ASSAY THYROID STIM HORMONE: CPT

## 2018-01-01 PROCEDURE — 85025 COMPLETE CBC W/AUTO DIFF WBC: CPT | Performed by: INTERNAL MEDICINE

## 2018-01-01 PROCEDURE — 84484 ASSAY OF TROPONIN QUANT: CPT | Performed by: PHYSICIAN ASSISTANT

## 2018-01-01 PROCEDURE — 93005 ELECTROCARDIOGRAM TRACING: CPT

## 2018-01-01 PROCEDURE — 74176 CT ABD & PELVIS W/O CONTRAST: CPT

## 2018-01-01 PROCEDURE — 85049 AUTOMATED PLATELET COUNT: CPT | Performed by: PHYSICIAN ASSISTANT

## 2018-01-01 PROCEDURE — 94002 VENT MGMT INPAT INIT DAY: CPT

## 2018-01-01 PROCEDURE — 81240 F2 GENE: CPT | Performed by: NURSE PRACTITIONER

## 2018-01-01 PROCEDURE — 80048 BASIC METABOLIC PNL TOTAL CA: CPT | Performed by: INTERNAL MEDICINE

## 2018-01-01 PROCEDURE — 04HY32Z INSERTION OF MONITORING DEVICE INTO LOWER ARTERY, PERCUTANEOUS APPROACH: ICD-10-PCS | Performed by: ANESTHESIOLOGY

## 2018-01-01 PROCEDURE — 85025 COMPLETE CBC W/AUTO DIFF WBC: CPT

## 2018-01-01 PROCEDURE — 85732 THROMBOPLASTIN TIME PARTIAL: CPT | Performed by: NURSE PRACTITIONER

## 2018-01-01 PROCEDURE — 80061 LIPID PANEL: CPT

## 2018-01-01 PROCEDURE — 36415 COLL VENOUS BLD VENIPUNCTURE: CPT

## 2018-01-01 PROCEDURE — 84540 ASSAY OF URINE/UREA-N: CPT | Performed by: INTERNAL MEDICINE

## 2018-01-01 PROCEDURE — 84484 ASSAY OF TROPONIN QUANT: CPT | Performed by: NURSE PRACTITIONER

## 2018-01-01 PROCEDURE — 82805 BLOOD GASES W/O2 SATURATION: CPT | Performed by: NURSE PRACTITIONER

## 2018-01-01 PROCEDURE — 99222 1ST HOSP IP/OBS MODERATE 55: CPT | Performed by: INTERNAL MEDICINE

## 2018-01-01 PROCEDURE — 80048 BASIC METABOLIC PNL TOTAL CA: CPT | Performed by: EMERGENCY MEDICINE

## 2018-01-01 PROCEDURE — 85613 RUSSELL VIPER VENOM DILUTED: CPT | Performed by: NURSE PRACTITIONER

## 2018-01-01 PROCEDURE — 80053 COMPREHEN METABOLIC PANEL: CPT

## 2018-01-01 PROCEDURE — 99223 1ST HOSP IP/OBS HIGH 75: CPT | Performed by: PHYSICIAN ASSISTANT

## 2018-01-01 PROCEDURE — 85027 COMPLETE CBC AUTOMATED: CPT | Performed by: ANESTHESIOLOGY

## 2018-01-01 PROCEDURE — 85306 CLOT INHIBIT PROT S FREE: CPT | Performed by: NURSE PRACTITIONER

## 2018-01-01 PROCEDURE — 84145 PROCALCITONIN (PCT): CPT | Performed by: NURSE PRACTITIONER

## 2018-01-01 PROCEDURE — 4A133B1 MONITORING OF ARTERIAL PRESSURE, PERIPHERAL, PERCUTANEOUS APPROACH: ICD-10-PCS | Performed by: ANESTHESIOLOGY

## 2018-01-01 PROCEDURE — 93325 DOPPLER ECHO COLOR FLOW MAPG: CPT | Performed by: INTERNAL MEDICINE

## 2018-01-01 PROCEDURE — 5A1935Z RESPIRATORY VENTILATION, LESS THAN 24 CONSECUTIVE HOURS: ICD-10-PCS | Performed by: ANESTHESIOLOGY

## 2018-01-01 PROCEDURE — 99214 OFFICE O/P EST MOD 30 MIN: CPT | Performed by: INTERNAL MEDICINE

## 2018-01-01 PROCEDURE — 85305 CLOT INHIBIT PROT S TOTAL: CPT | Performed by: NURSE PRACTITIONER

## 2018-01-01 PROCEDURE — 4A133J1 MONITORING OF ARTERIAL PULSE, PERIPHERAL, PERCUTANEOUS APPROACH: ICD-10-PCS | Performed by: ANESTHESIOLOGY

## 2018-01-01 PROCEDURE — 80053 COMPREHEN METABOLIC PANEL: CPT | Performed by: ANESTHESIOLOGY

## 2018-01-01 PROCEDURE — 93321 DOPPLER ECHO F-UP/LMTD STD: CPT | Performed by: INTERNAL MEDICINE

## 2018-01-01 PROCEDURE — 93010 ELECTROCARDIOGRAM REPORT: CPT | Performed by: INTERNAL MEDICINE

## 2018-01-01 PROCEDURE — 85303 CLOT INHIBIT PROT C ACTIVITY: CPT | Performed by: NURSE PRACTITIONER

## 2018-01-01 PROCEDURE — 36415 COLL VENOUS BLD VENIPUNCTURE: CPT | Performed by: EMERGENCY MEDICINE

## 2018-01-01 PROCEDURE — 99285 EMERGENCY DEPT VISIT HI MDM: CPT

## 2018-01-01 PROCEDURE — 85025 COMPLETE CBC W/AUTO DIFF WBC: CPT | Performed by: PHYSICIAN ASSISTANT

## 2018-01-01 PROCEDURE — 85610 PROTHROMBIN TIME: CPT | Performed by: EMERGENCY MEDICINE

## 2018-01-01 PROCEDURE — 82570 ASSAY OF URINE CREATININE: CPT | Performed by: INTERNAL MEDICINE

## 2018-01-01 PROCEDURE — 83880 ASSAY OF NATRIURETIC PEPTIDE: CPT | Performed by: INTERNAL MEDICINE

## 2018-01-01 PROCEDURE — 0BH17EZ INSERTION OF ENDOTRACHEAL AIRWAY INTO TRACHEA, VIA NATURAL OR ARTIFICIAL OPENING: ICD-10-PCS | Performed by: ANESTHESIOLOGY

## 2018-01-01 PROCEDURE — 94760 N-INVAS EAR/PLS OXIMETRY 1: CPT

## 2018-01-01 PROCEDURE — 85007 BL SMEAR W/DIFF WBC COUNT: CPT | Performed by: ANESTHESIOLOGY

## 2018-01-01 PROCEDURE — 85018 HEMOGLOBIN: CPT | Performed by: NURSE PRACTITIONER

## 2018-01-01 PROCEDURE — 04HK33Z INSERTION OF INFUSION DEVICE INTO RIGHT FEMORAL ARTERY, PERCUTANEOUS APPROACH: ICD-10-PCS | Performed by: ANESTHESIOLOGY

## 2018-01-01 PROCEDURE — 71250 CT THORAX DX C-: CPT

## 2018-01-01 PROCEDURE — 81241 F5 GENE: CPT | Performed by: NURSE PRACTITIONER

## 2018-01-01 PROCEDURE — 71046 X-RAY EXAM CHEST 2 VIEWS: CPT

## 2018-01-01 PROCEDURE — 80076 HEPATIC FUNCTION PANEL: CPT | Performed by: EMERGENCY MEDICINE

## 2018-01-01 PROCEDURE — 84484 ASSAY OF TROPONIN QUANT: CPT | Performed by: EMERGENCY MEDICINE

## 2018-01-01 PROCEDURE — 70450 CT HEAD/BRAIN W/O DYE: CPT

## 2018-01-01 PROCEDURE — 81003 URINALYSIS AUTO W/O SCOPE: CPT | Performed by: INTERNAL MEDICINE

## 2018-01-01 PROCEDURE — 80048 BASIC METABOLIC PNL TOTAL CA: CPT | Performed by: NURSE PRACTITIONER

## 2018-01-01 PROCEDURE — 85730 THROMBOPLASTIN TIME PARTIAL: CPT | Performed by: ANESTHESIOLOGY

## 2018-01-01 PROCEDURE — 80307 DRUG TEST PRSMV CHEM ANLYZR: CPT | Performed by: NURSE PRACTITIONER

## 2018-01-01 RX ORDER — CHLORAL HYDRATE 500 MG
1000 CAPSULE ORAL 2 TIMES DAILY
Status: DISCONTINUED | OUTPATIENT
Start: 2018-01-01 | End: 2018-01-01

## 2018-01-01 RX ORDER — FUROSEMIDE 40 MG/1
40 TABLET ORAL DAILY
Qty: 90 TABLET | Refills: 3 | Status: SHIPPED | OUTPATIENT
Start: 2018-01-01

## 2018-01-01 RX ORDER — EPINEPHRINE 0.1 MG/ML
SYRINGE (ML) INJECTION CODE/TRAUMA/SEDATION MEDICATION
Status: COMPLETED | OUTPATIENT
Start: 2018-01-01 | End: 2018-01-01

## 2018-01-01 RX ORDER — FUROSEMIDE 40 MG/1
40 TABLET ORAL
Status: DISCONTINUED | OUTPATIENT
Start: 2018-01-01 | End: 2018-01-01

## 2018-01-01 RX ORDER — MELOXICAM 7.5 MG/1
TABLET ORAL
Qty: 90 TABLET | Refills: 0 | Status: SHIPPED | OUTPATIENT
Start: 2018-01-01 | End: 2018-01-01 | Stop reason: SDUPTHER

## 2018-01-01 RX ORDER — LANOLIN ALCOHOL/MO/W.PET/CERES
1 CREAM (GRAM) TOPICAL DAILY
COMMUNITY

## 2018-01-01 RX ORDER — ACETAMINOPHEN 325 MG/1
975 TABLET ORAL EVERY 6 HOURS
Status: DISCONTINUED | OUTPATIENT
Start: 2018-01-01 | End: 2018-01-01

## 2018-01-01 RX ORDER — HEPARIN SODIUM 5000 [USP'U]/ML
5000 INJECTION, SOLUTION INTRAVENOUS; SUBCUTANEOUS EVERY 8 HOURS SCHEDULED
Status: DISCONTINUED | OUTPATIENT
Start: 2018-01-01 | End: 2018-01-01

## 2018-01-01 RX ORDER — CHLORHEXIDINE GLUCONATE 0.12 MG/ML
15 RINSE ORAL EVERY 12 HOURS SCHEDULED
Status: DISCONTINUED | OUTPATIENT
Start: 2018-01-01 | End: 2018-01-01

## 2018-01-01 RX ORDER — ONDANSETRON 2 MG/ML
4 INJECTION INTRAMUSCULAR; INTRAVENOUS EVERY 6 HOURS PRN
Status: DISCONTINUED | OUTPATIENT
Start: 2018-01-01 | End: 2018-01-01

## 2018-01-01 RX ORDER — MORPHINE SULFATE 4 MG/ML
4 INJECTION, SOLUTION INTRAMUSCULAR; INTRAVENOUS ONCE
Status: COMPLETED | OUTPATIENT
Start: 2018-01-01 | End: 2018-01-01

## 2018-01-01 RX ORDER — MORPHINE SULFATE 2 MG/ML
2 INJECTION, SOLUTION INTRAMUSCULAR; INTRAVENOUS ONCE
Status: COMPLETED | OUTPATIENT
Start: 2018-01-01 | End: 2018-01-01

## 2018-01-01 RX ORDER — MORPHINE SULFATE 10 MG/ML
10 INJECTION, SOLUTION INTRAMUSCULAR; INTRAVENOUS ONCE
Status: COMPLETED | OUTPATIENT
Start: 2018-01-01 | End: 2018-01-01

## 2018-01-01 RX ORDER — METOPROLOL SUCCINATE 100 MG/1
100 TABLET, EXTENDED RELEASE ORAL DAILY
Qty: 90 TABLET | Refills: 0 | Status: SHIPPED | OUTPATIENT
Start: 2018-01-01

## 2018-01-01 RX ORDER — MELOXICAM 7.5 MG/1
7.5 TABLET ORAL DAILY
Qty: 90 TABLET | Refills: 0 | Status: SHIPPED | OUTPATIENT
Start: 2018-01-01 | End: 2018-01-01 | Stop reason: SDUPTHER

## 2018-01-01 RX ORDER — SODIUM CHLORIDE, SODIUM GLUCONATE, SODIUM ACETATE, POTASSIUM CHLORIDE, MAGNESIUM CHLORIDE, SODIUM PHOSPHATE, DIBASIC, AND POTASSIUM PHOSPHATE .53; .5; .37; .037; .03; .012; .00082 G/100ML; G/100ML; G/100ML; G/100ML; G/100ML; G/100ML; G/100ML
1000 INJECTION, SOLUTION INTRAVENOUS ONCE
Status: COMPLETED | OUTPATIENT
Start: 2018-01-01 | End: 2018-01-01

## 2018-01-01 RX ORDER — FUROSEMIDE 40 MG/1
40 TABLET ORAL DAILY
Qty: 90 TABLET | Refills: 0 | Status: SHIPPED | OUTPATIENT
Start: 2018-01-01 | End: 2018-01-01 | Stop reason: SDUPTHER

## 2018-01-01 RX ORDER — ALBUMIN, HUMAN INJ 5% 5 %
SOLUTION INTRAVENOUS
Status: DISCONTINUED
Start: 2018-01-01 | End: 2018-07-09 | Stop reason: HOSPADM

## 2018-01-01 RX ORDER — POTASSIUM CHLORIDE 29.8 MG/ML
40 INJECTION INTRAVENOUS ONCE
Status: COMPLETED | OUTPATIENT
Start: 2018-01-01 | End: 2018-01-01

## 2018-01-01 RX ORDER — FUROSEMIDE 10 MG/ML
80 INJECTION INTRAMUSCULAR; INTRAVENOUS ONCE
Status: COMPLETED | OUTPATIENT
Start: 2018-01-01 | End: 2018-01-01

## 2018-01-01 RX ORDER — POTASSIUM CHLORIDE 20 MEQ/1
40 TABLET, EXTENDED RELEASE ORAL DAILY
Status: DISCONTINUED | OUTPATIENT
Start: 2018-01-01 | End: 2018-01-01

## 2018-01-01 RX ORDER — FENTANYL CITRATE 50 UG/ML
INJECTION, SOLUTION INTRAMUSCULAR; INTRAVENOUS
Status: COMPLETED
Start: 2018-01-01 | End: 2018-01-01

## 2018-01-01 RX ORDER — CALCIUM CHLORIDE 100 MG/ML
SYRINGE (ML) INTRAVENOUS CODE/TRAUMA/SEDATION MEDICATION
Status: COMPLETED | OUTPATIENT
Start: 2018-01-01 | End: 2018-01-01

## 2018-01-01 RX ORDER — MORPHINE SULFATE 10 MG/ML
6 INJECTION, SOLUTION INTRAMUSCULAR; INTRAVENOUS ONCE
Status: COMPLETED | OUTPATIENT
Start: 2018-01-01 | End: 2018-01-01

## 2018-01-01 RX ORDER — MELOXICAM 7.5 MG/1
TABLET ORAL
Qty: 90 TABLET | Refills: 0 | Status: SHIPPED | OUTPATIENT
Start: 2018-01-01

## 2018-01-01 RX ORDER — ALBUMIN, HUMAN INJ 5% 5 %
25 SOLUTION INTRAVENOUS ONCE
Status: COMPLETED | OUTPATIENT
Start: 2018-01-01 | End: 2018-01-01

## 2018-01-01 RX ORDER — METOPROLOL SUCCINATE 100 MG/1
100 TABLET, EXTENDED RELEASE ORAL DAILY
Qty: 90 TABLET | Refills: 0 | Status: SHIPPED | OUTPATIENT
Start: 2018-01-01 | End: 2018-01-01 | Stop reason: SDUPTHER

## 2018-01-01 RX ORDER — LORAZEPAM 2 MG/ML
2 INJECTION INTRAMUSCULAR ONCE
Status: COMPLETED | OUTPATIENT
Start: 2018-01-01 | End: 2018-01-01

## 2018-01-01 RX ORDER — LORAZEPAM 2 MG/ML
INJECTION INTRAMUSCULAR
Status: COMPLETED
Start: 2018-01-01 | End: 2018-01-01

## 2018-01-01 RX ORDER — SODIUM BICARBONATE 84 MG/ML
INJECTION, SOLUTION INTRAVENOUS CODE/TRAUMA/SEDATION MEDICATION
Status: COMPLETED | OUTPATIENT
Start: 2018-01-01 | End: 2018-01-01

## 2018-01-01 RX ORDER — ASPIRIN 81 MG/1
81 TABLET ORAL DAILY
Status: DISCONTINUED | OUTPATIENT
Start: 2018-01-01 | End: 2018-01-01

## 2018-01-01 RX ORDER — ATORVASTATIN CALCIUM 20 MG/1
20 TABLET, FILM COATED ORAL DAILY
Qty: 30 TABLET | Refills: 3 | Status: SHIPPED | OUTPATIENT
Start: 2018-01-01

## 2018-01-01 RX ORDER — ATORVASTATIN CALCIUM 20 MG/1
1 TABLET, FILM COATED ORAL DAILY
COMMUNITY
Start: 2017-06-23 | End: 2018-01-01 | Stop reason: SDUPTHER

## 2018-01-01 RX ORDER — MELOXICAM 7.5 MG/1
1 TABLET ORAL DAILY PRN
COMMUNITY
Start: 2016-10-10 | End: 2018-01-01 | Stop reason: SDUPTHER

## 2018-01-01 RX ORDER — SODIUM CHLORIDE, SODIUM GLUCONATE, SODIUM ACETATE, POTASSIUM CHLORIDE, MAGNESIUM CHLORIDE, SODIUM PHOSPHATE, DIBASIC, AND POTASSIUM PHOSPHATE .53; .5; .37; .037; .03; .012; .00082 G/100ML; G/100ML; G/100ML; G/100ML; G/100ML; G/100ML; G/100ML
125 INJECTION, SOLUTION INTRAVENOUS CONTINUOUS
Status: DISCONTINUED | OUTPATIENT
Start: 2018-01-01 | End: 2018-01-01

## 2018-01-01 RX ORDER — METOPROLOL SUCCINATE 100 MG/1
100 TABLET, EXTENDED RELEASE ORAL DAILY
Status: DISCONTINUED | OUTPATIENT
Start: 2018-01-01 | End: 2018-01-01

## 2018-01-01 RX ORDER — FENTANYL CITRATE/PF 50 MCG/ML
100 SYRINGE (ML) INJECTION ONCE
Status: COMPLETED | OUTPATIENT
Start: 2018-01-01 | End: 2018-01-01

## 2018-01-01 RX ORDER — ACETAMINOPHEN 325 MG/1
650 TABLET ORAL EVERY 6 HOURS PRN
Status: DISCONTINUED | OUTPATIENT
Start: 2018-01-01 | End: 2018-01-01

## 2018-01-01 RX ORDER — MELATONIN
1000 DAILY
Status: DISCONTINUED | OUTPATIENT
Start: 2018-01-01 | End: 2018-01-01

## 2018-01-01 RX ORDER — LORAZEPAM 2 MG/ML
4 INJECTION INTRAMUSCULAR ONCE
Status: COMPLETED | OUTPATIENT
Start: 2018-01-01 | End: 2018-01-01

## 2018-01-01 RX ORDER — ATORVASTATIN CALCIUM 10 MG/1
20 TABLET, FILM COATED ORAL
Status: DISCONTINUED | OUTPATIENT
Start: 2018-01-01 | End: 2018-01-01

## 2018-01-01 RX ORDER — AMIODARONE HYDROCHLORIDE 50 MG/ML
INJECTION, SOLUTION INTRAVENOUS CODE/TRAUMA/SEDATION MEDICATION
Status: COMPLETED | OUTPATIENT
Start: 2018-01-01 | End: 2018-01-01

## 2018-01-01 RX ORDER — FUROSEMIDE 10 MG/ML
60 INJECTION INTRAMUSCULAR; INTRAVENOUS
Status: DISCONTINUED | OUTPATIENT
Start: 2018-01-01 | End: 2018-01-01

## 2018-01-01 RX ORDER — POTASSIUM CHLORIDE 29.8 MG/ML
40 INJECTION INTRAVENOUS ONCE
Status: DISCONTINUED | OUTPATIENT
Start: 2018-01-01 | End: 2018-01-01

## 2018-01-01 RX ADMIN — CHOLECALCIFEROL TAB 25 MCG (1000 UNIT) 1000 UNITS: 25 TAB at 08:42

## 2018-01-01 RX ADMIN — FUROSEMIDE 80 MG: 10 INJECTION, SOLUTION INTRAMUSCULAR; INTRAVENOUS at 12:38

## 2018-01-01 RX ADMIN — Medication 1000 MG: at 11:15

## 2018-01-01 RX ADMIN — ATORVASTATIN CALCIUM 20 MG: 20 TABLET, FILM COATED ORAL at 17:43

## 2018-01-01 RX ADMIN — SODIUM CHLORIDE, SODIUM LACTATE, POTASSIUM CHLORIDE, AND CALCIUM CHLORIDE 1000 ML: .6; .31; .03; .02 INJECTION, SOLUTION INTRAVENOUS at 15:14

## 2018-01-01 RX ADMIN — FENTANYL CITRATE 100 MCG: 50 INJECTION, SOLUTION INTRAMUSCULAR; INTRAVENOUS at 15:10

## 2018-01-01 RX ADMIN — ALBUMIN HUMAN 25 G: 0.05 INJECTION, SOLUTION INTRAVENOUS at 12:43

## 2018-01-01 RX ADMIN — MORPHINE SULFATE 10 MG: 10 INJECTION INTRAVENOUS at 18:27

## 2018-01-01 RX ADMIN — EPINEPHRINE 1 MG: 0.1 INJECTION, SOLUTION ENDOTRACHEAL; INTRACARDIAC; INTRAVENOUS at 11:09

## 2018-01-01 RX ADMIN — LORAZEPAM 2 MG: 2 INJECTION INTRAMUSCULAR; INTRAVENOUS at 17:57

## 2018-01-01 RX ADMIN — SODIUM CHLORIDE, SODIUM GLUCONATE, SODIUM ACETATE, POTASSIUM CHLORIDE, MAGNESIUM CHLORIDE, SODIUM PHOSPHATE, DIBASIC, AND POTASSIUM PHOSPHATE 1000 ML: .53; .5; .37; .037; .03; .012; .00082 INJECTION, SOLUTION INTRAVENOUS at 15:14

## 2018-01-01 RX ADMIN — FUROSEMIDE 40 MG: 40 TABLET ORAL at 08:42

## 2018-01-01 RX ADMIN — EPINEPHRINE 3 MCG/MIN: 1 INJECTION, SOLUTION INTRAMUSCULAR; SUBCUTANEOUS at 11:50

## 2018-01-01 RX ADMIN — Medication 10 MG/HR: at 19:12

## 2018-01-01 RX ADMIN — Medication 1000 MG: at 17:43

## 2018-01-01 RX ADMIN — ASPIRIN 81 MG: 81 TABLET, COATED ORAL at 08:42

## 2018-01-01 RX ADMIN — MORPHINE SULFATE 4 MG: 4 INJECTION, SOLUTION INTRAMUSCULAR; INTRAVENOUS at 17:53

## 2018-01-01 RX ADMIN — METOPROLOL SUCCINATE 100 MG: 100 TABLET, EXTENDED RELEASE ORAL at 08:42

## 2018-01-01 RX ADMIN — WATER 100 MG: 1 INJECTION INTRAMUSCULAR; INTRAVENOUS; SUBCUTANEOUS at 12:16

## 2018-01-01 RX ADMIN — EPINEPHRINE 1 MG: 0.1 INJECTION, SOLUTION ENDOTRACHEAL; INTRACARDIAC; INTRAVENOUS at 11:12

## 2018-01-01 RX ADMIN — MORPHINE SULFATE 2 MG: 2 INJECTION, SOLUTION INTRAMUSCULAR; INTRAVENOUS at 12:38

## 2018-01-01 RX ADMIN — VASOPRESSIN 0.04 UNITS/MIN: 20 INJECTION INTRAVENOUS at 12:48

## 2018-01-01 RX ADMIN — CALCIUM CHLORIDE 1 G: 100 INJECTION PARENTERAL at 11:10

## 2018-01-01 RX ADMIN — Medication 1000 MG: at 08:42

## 2018-01-01 RX ADMIN — SODIUM BICARBONATE 50 MEQ: 84 INJECTION, SOLUTION INTRAVENOUS at 11:09

## 2018-01-01 RX ADMIN — FUROSEMIDE 40 MG: 40 TABLET ORAL at 17:42

## 2018-01-01 RX ADMIN — CALCIUM CHLORIDE 1 G: 100 INJECTION PARENTERAL at 11:20

## 2018-01-01 RX ADMIN — SODIUM BICARBONATE 75 MEQ: 84 INJECTION, SOLUTION INTRAVENOUS at 13:07

## 2018-01-01 RX ADMIN — LORAZEPAM 2 MG: 2 INJECTION INTRAMUSCULAR at 17:57

## 2018-01-01 RX ADMIN — ASPIRIN 81 MG: 81 TABLET, COATED ORAL at 11:10

## 2018-01-01 RX ADMIN — POTASSIUM CHLORIDE 40 MEQ: 1500 TABLET, EXTENDED RELEASE ORAL at 10:14

## 2018-01-01 RX ADMIN — SODIUM CHLORIDE, SODIUM GLUCONATE, SODIUM ACETATE, POTASSIUM CHLORIDE, MAGNESIUM CHLORIDE, SODIUM PHOSPHATE, DIBASIC, AND POTASSIUM PHOSPHATE 125 ML/HR: .53; .5; .37; .037; .03; .012; .00082 INJECTION, SOLUTION INTRAVENOUS at 13:03

## 2018-01-01 RX ADMIN — SODIUM BICARBONATE 25 MEQ: 84 INJECTION, SOLUTION INTRAVENOUS at 12:43

## 2018-01-01 RX ADMIN — Medication 1000 MG: at 21:57

## 2018-01-01 RX ADMIN — NOREPINEPHRINE BITARTRATE 10 MCG/MIN: 1 INJECTION INTRAVENOUS at 11:41

## 2018-01-01 RX ADMIN — Medication 100 MCG: at 15:10

## 2018-01-01 RX ADMIN — POTASSIUM CHLORIDE 40 MEQ: 400 INJECTION, SOLUTION INTRAVENOUS at 14:54

## 2018-01-01 RX ADMIN — EPINEPHRINE 0.3 MG: 0.1 INJECTION, SOLUTION ENDOTRACHEAL; INTRACARDIAC; INTRAVENOUS at 11:21

## 2018-01-01 RX ADMIN — EPINEPHRINE 0.7 MG: 0.1 INJECTION, SOLUTION ENDOTRACHEAL; INTRACARDIAC; INTRAVENOUS at 11:22

## 2018-01-01 RX ADMIN — AMIODARONE HYDROCHLORIDE 300 MG: 50 INJECTION, SOLUTION INTRAVENOUS at 11:12

## 2018-01-01 RX ADMIN — SODIUM BICARBONATE 50 MEQ: 84 INJECTION, SOLUTION INTRAVENOUS at 11:13

## 2018-01-01 RX ADMIN — CHOLECALCIFEROL TAB 25 MCG (1000 UNIT) 1000 UNITS: 25 TAB at 11:10

## 2018-01-01 RX ADMIN — LORAZEPAM 4 MG: 2 INJECTION INTRAMUSCULAR; INTRAVENOUS at 18:26

## 2018-01-01 RX ADMIN — MORPHINE SULFATE 6 MG: 10 INJECTION, SOLUTION INTRAMUSCULAR; INTRAVENOUS at 17:30

## 2018-01-01 RX ADMIN — SODIUM CHLORIDE, SODIUM LACTATE, POTASSIUM CHLORIDE, AND CALCIUM CHLORIDE 1000 ML: .6; .31; .03; .02 INJECTION, SOLUTION INTRAVENOUS at 12:44

## 2018-01-01 RX ADMIN — FUROSEMIDE 80 MG: 10 INJECTION, SOLUTION INTRAMUSCULAR; INTRAVENOUS at 20:06

## 2018-01-09 NOTE — RESULT NOTES
Verified Results  (1) ALDOLASE 08Xol2964 09:49AM Donna Goldberg Order Number: [id number]     Test Name Result Flag Reference   ALDOLASE 7 3 U/L  3 3 - 10 3   Performed at:  [address] [place] [address]  [id number]  : Jesús Yung MD, Phone:  [phone]

## 2018-01-10 NOTE — RESULT NOTES
Verified Results  (1) CK (CPK) 17KRF9856 10:19AM Roxanne Fearing Order Number: [id number]     Test Name Result Flag Reference   CK (CPK) 256 U/L H    CK MB FRACTION 2 0 ng/mL  0 0-5 0   CK-MB INDEX <1 0 %  0 0-2 5

## 2018-01-14 NOTE — RESULT NOTES
Verified Results  * DXA BONE DENSITY SPINE HIP AND PELVIS 67TCB7654 02:11AM Abelardo Lowery    Order Number: [id number]    - Patient Instructions: To schedule this appointment, please contact Central Scheduling at [phone]  Test Name Result Flag Reference   DXA BONE DENSITY SPINE HIP AND PELVIS (Report)     DXA SCAN     CLINICAL HISTORY: 60-year-old woman  Menopause at age 47  History of right hip arthroplasty and history of surgery for lower lumbar spinal stenosis   OTHER RISK FACTORS: Chronic poor mobility due to spinal stenosis and arthritis in both knees  TECHNIQUE: Bone densitometry was performed using a HoloLingoing Horizon C bone densitometer  Regions of interest appear properly placed  COMPARISON: There are no prior DXA studies performed on this unit for comparison  RESULTS:      LUMBAR SPINE L1-L2: BMD 1 452 gm/cm2 / T-score 4 3 / Z score 6 0   (Lumbar levels within parentheses [if any] represent vertebrae excluded from analysis due to local structural abnormalities or artifact)  LEFT TOTAL HIP: BMD 1 169 gm/cm2 / T-score 1 9 / Z score 2 2   LEFT FEMORAL NECK: BMD 1 041 gm/cm2 / T score 1 7 / Z score 2 2     RIGHT FOREARM: 33% RADIUS BMD 0 733 gm/cm2 / T-score 0 8 / Z score 3 6       IMPRESSION:     1  Normal bone density  2  The 10 year risk of hip fracture is 0 1% with the 10 year risk of major osteoporotic fracture being 2 1% as calculated by the [facility]/WHO fracture risk assessment tool (FRAX)  3  The current NOF guidelines recommend treating patients with a T-score of -2 5 or less in the lumbar spine or hips, or in post-menopausal women and men over the age of 48 with low bone mass (osteopenia) and a FRAX 10 year risk score of >3% for hip    fracture and/or >20% for major osteoporotic fracture       4  A daily intake of at least 1200 mg calcium and vitamin D 800- 1000 IU, as well as weight bearing and muscle strengthening exercise, fall prevention and avoidance of tobacco and excessive alcohol as preventive measurements are suggested  5  Follow-up DXA in two years is recommended for most patients  A one year follow-up DXA is recommended after initiation or change in therapy for osteoporosis  More frequent evaluation is also appropriate for patients with conditions associated with    rapid bone loss, such as glucocorticoid therapy  The FRAX tool has not been validated in patients currently or previously treated with pharmacotherapy for osteoporosis  In such patients, clinical judgment must be exercised in interpreting FRAX scores  It is not appropriate to use FRAX to monitor    treatment response         WHO CLASSIFICATION:   Normal (a T-score of -1 0 or higher)   Low bone mineral density (a T-score of less than -1 0 but higher than -2 5)   Osteoporosis (a T-score of -2 5 or less)   Severe osteoporosis (a T-score of -2 5 or less with a fragility fracture)       Workstation performed: [id number]     Signed by:   Aurelia Sarmiento MD   8/28/17

## 2018-01-15 NOTE — RESULT NOTES
Verified Results  MAMMO SCREENING BILATERAL W 3D & CAD 50MMD7421 03:40PM Luci Collins     Test Name Result Flag Reference   MAMMO SCREENING BILATERAL W 3D & CAD (Report)     Patient History:   Patient is postmenopausal    Family history of prostate cancer at age 54 in maternal cousin  Patient is a former smoker  Patient's BMI is 47 9  Reason for exam: screening, asymptomatic  Mammo Screening Bilateral W DBT and CAD: October 2, 2017 - Check    In #: [de-identified]   2D/3D Procedure   3D views: Bilateral MLO view(s) were taken  2D views: Bilateral CC view(s) were taken  MLO view(s) were    taken of the right breast        Technologists: BULMARO Vann  RM; BULMARO Lim (R)(M)   Prior study comparison: Saadia 15, 2016, mammo screening bilateral    W CAD performed at [address] [place] [address]  January 16, 2014, bilateral screening mammogram, performed at    [facility]  May 23, 2012, bilateral screening mammogram, performed at    [facility]  November 4, 2010, bilateral screening mammogram, performed    at [facility]  August 19, 2009, bilateral screening mammogram,    performed at [facility]  The breast tissue is almost entirely fat  A combination of    mediolateral oblique 3D tomographic slices as well as standard    two-dimensional orthogonal images were obtained  No dominant soft tissue mass, architectural distortion or    suspicious calcifications are noted in either breast  The skin    and nipple structures are within normal limits  Scattered benign   appearing calcifications are noted  No significant changes when compared with prior studies  ACR BI-RADSï¾® Assessments: BiRad:1 - Negative     Recommendation:   Routine screening mammogram of both breasts in 1 year  A    reminder letter will be scheduled  The patient is scheduled in a reminder system for screening    mammography  8-10% of cancers will be missed on mammography   Management of a    palpable abnormality must be based on clinical grounds  Patients    will be notified of their results via letter from our facility  Accredited by [employer] of [employer] and FDA       Transcription Location: BULMARO Ortega 98: [id number]     Risk Value(s):   Tyrer-Cuzick 10 Year: 3 500%, Tyrer-Cuzick Lifetime: 3 500%,    Myriad Table: 1 5%, CRISTIAN 5 Year: 2 4%, NCI Lifetime: 4 9%

## 2018-01-15 NOTE — RESULT NOTES
Verified Results  (1) CBC/PLT/DIFF 14Oct2016 10:08AM Song Phoenix Order Number: [id number]     Test Name Result Flag Reference   WBC COUNT 4 37 Thousand/uL  4 31-10 16   RBC COUNT 4 57 Million/uL  3 81-5 12   HEMOGLOBIN 12 9 g/dL  11 5-15 4   HEMATOCRIT 40 2 %  34 8-46  1   MCV 88 fL  82-98   MCH 28 2 pg  26 8-34 3   MCHC 32 1 g/dL  31 4-37 4   RDW 15 2 % H 11 6-15 1   MPV 13 4 fL H 8 9-12 7   PLATELET COUNT 819 Thousands/uL  149-390   nRBC AUTOMATED 0 /100 WBCs     NEUTROPHILS RELATIVE PERCENT 51 %  43-75   LYMPHOCYTES RELATIVE PERCENT 29 %  14-44   MONOCYTES RELATIVE PERCENT 15 % H 4-12   EOSINOPHILS RELATIVE PERCENT 4 %  0-6   BASOPHILS RELATIVE PERCENT 1 %  0-1   NEUTROPHILS ABSOLUTE COUNT 2 23 Thousands/?L  1 85-7 62   LYMPHOCYTES ABSOLUTE COUNT 1 26 Thousands/?L  0 60-4 47   MONOCYTES ABSOLUTE COUNT 0 67 Thousand/?L  0 17-1 22   EOSINOPHILS ABSOLUTE COUNT 0 18 Thousand/?L  0 00-0 61   BASOPHILS ABSOLUTE COUNT 0 02 Thousands/?L  0 00-0 10   - Patient Instructions: This bloodwork is non-fasting  Please drink two glasses of water morning of bloodwork  - Patient Instructions: This bloodwork is non-fasting  Please drink two glasses of water morning of bloodwork       (1) CK (CPK) 14Oct2016 10:08AM Song Phoenix Order Number: [id number]     Test Name Result Flag Reference   CK (CPK) 316 U/L H    CK MB FRACTION 3 0 ng/mL  0 0-5 0   CK-MB INDEX <1 0 %  0 0-2 5

## 2018-01-18 NOTE — RESULT NOTES
Verified Results  (1) CK (CPK) 26Oct2016 09:49AM Jalen Thornton Order Number: [id number]  TW Order Number: [id number]     Test Name Result Flag Reference   CK (CPK) 214 U/L H    CK MB FRACTION 1 8 ng/mL  0 0-5 0   CK-MB INDEX <1 0 %  0 0-2 5

## 2018-02-27 PROBLEM — R60.0 LOWER EXTREMITY EDEMA: Status: ACTIVE | Noted: 2018-01-01

## 2018-02-27 PROBLEM — E78.2 MIXED HYPERLIPIDEMIA: Status: ACTIVE | Noted: 2018-01-01

## 2018-02-27 PROBLEM — I10 ESSENTIAL HYPERTENSION: Status: ACTIVE | Noted: 2018-01-01

## 2018-02-27 PROBLEM — I27.20 MILD PULMONARY HYPERTENSION (HCC): Status: ACTIVE | Noted: 2018-01-01

## 2018-02-27 PROBLEM — I51.7 LEFT VENTRICULAR HYPERTROPHY: Status: ACTIVE | Noted: 2018-01-01

## 2018-02-27 NOTE — PROGRESS NOTES
Cardiology Consultation     Jody Mathew  [id number]  1940  [address] [facility] [place] [address]    C/C: Follow-up of hyperlipidemia, edema and elevated CPK    HPI:  70-YEAR-OLD FEMALE PATIENT with past medical history of hypertension and hyperlipidemia is here for follow-up  Patient has chronically elevated CPK  It is mildly elevated in 300s  She complains of bilateral knee pain  She denies having any myalgias  She recently stopped taking Lipitor  Patient also has chronic lower extremity edema for which she takes Lasix  She has not had an echocardiogram done in 3 years  She denies having any chest pain, shortness of breath, orthopnea paroxysmal nocturnal dyspnea  She had lipid profile in January while she was not on Lipitor and her LDL was 69 and HDL of 74  Last echocardiogram was back in 2015 which showed concentric left ventricular hypertrophy, LVEF of 65%, mild pulmonary hypertension with PA systolic pressure 35 mm of mercury  Past Medical History:   Diagnosis Date    Hyperlipidemia     Hypertension     Lower extremity edema     Obesity     Paroxysmal supraventricular tachycardia (HCC)     Thrombocytopenia (HCC)     Thyromegaly      Social History     Social History    Marital status:      Spouse name: N/A    Number of children: N/A    Years of education: N/A     Occupational History    Not on file  Social History Main Topics    Smoking status: Never Smoker    Smokeless tobacco: Never Used    Alcohol use No    Drug use: Unknown    Sexual activity: Not on file     Other Topics Concern    Not on file     Social History Narrative    No narrative on file      History reviewed  No pertinent family history    Past Surgical History:   Procedure Laterality Date    BACK SURGERY      CATARACT EXTRACTION      DILATION AND CURETTAGE OF UTERUS      HIP SURGERY      HYSTERECTOMY      TOTAL KNEE ARTHROPLASTY         Current Outpatient Prescriptions:     aspirin (ECOTRIN LOW STRENGTH) 81 mg EC tablet, Take 81 mg by mouth daily, Disp: , Rfl:     cholecalciferol (VITAMIN D3) 1,000 units tablet, Take 1,000 Units by mouth daily, Disp: , Rfl:     furosemide (LASIX) 40 mg tablet, Take 40 mg by mouth 2 (two) times a day, Disp: , Rfl:     hydrocortisone 2 5 % cream, Apply topically 4 (four) times a day as needed (itching), Disp: 30 g, Rfl: 0    metoprolol succinate (TOPROL-XL) 100 mg 24 hr tablet, Take 100 mg by mouth daily, Disp: , Rfl:     Multiple Vitamin (MULTIVITAMIN) tablet, Take 1 tablet by mouth daily, Disp: , Rfl:     Omega-3 Fatty Acids (FISH OIL) 1,000 mg, Take 1,000 mg by mouth 2 (two) times a day, Disp: , Rfl:     valsartan-hydrochlorothiazide (DIOVAN HCT) 80-12 5 MG per tablet, Take 1 tablet by mouth daily, Disp: , Rfl:     atorvastatin (LIPITOR) 40 mg tablet, Take 40 mg by mouth daily, Disp: , Rfl:   No Known Allergies  Vitals:    02/27/18 1517   BP: 116/52   Pulse: 56   SpO2: 97%   Weight: 127 kg (280 lb)   Height: 5' 6" (1 676 m)     Imaging: No results found  Review of Systems:  Review of Systems   Constitutional: Negative for diaphoresis and fatigue  HENT: Negative for congestion and facial swelling  Eyes: Negative for photophobia and visual disturbance  Respiratory: Negative for chest tightness and shortness of breath  Cardiovascular: Positive for leg swelling  Negative for chest pain and palpitations  Gastrointestinal: Negative for abdominal pain and nausea  Endocrine: Negative for cold intolerance and heat intolerance  Musculoskeletal: Positive for arthralgias  Negative for myalgias  Skin: Negative for pallor and rash  Neurological: Negative for dizziness and tremors  Psychiatric/Behavioral: Negative for sleep disturbance  The patient is not nervous/anxious          Physical Exam:  Physical Exam   Constitutional: She is oriented to person, place, and time  She appears well-developed and well-nourished  HENT:   Head: Normocephalic and atraumatic  Eyes: Conjunctivae and EOM are normal  Pupils are equal, round, and reactive to light  Neck: Normal range of motion  Neck supple  No JVD present  No thyromegaly present  Cardiovascular: Normal rate, regular rhythm, S1 normal, S2 normal, normal heart sounds and intact distal pulses  Exam reveals no gallop and no friction rub  No murmur heard  Pulses:       Carotid pulses are 2+ on the right side, and 2+ on the left side  Pulmonary/Chest: Effort normal and breath sounds normal  No respiratory distress  She has no wheezes  She has no rales  Abdominal: Soft  Bowel sounds are normal  She exhibits no distension  There is no tenderness  There is no rebound and no guarding  Musculoskeletal: Normal range of motion  She exhibits edema  Neurological: She is alert and oriented to person, place, and time  She has normal reflexes  No cranial nerve deficit  Skin: Skin is warm and dry  Psychiatric: She has a normal mood and affect  Discussion/Summary:  1  Hyperlipidemia, LDL at goal in spite of not taking Lipitor  Will continue to hold statins for now    2  Hypertension, blood pressure well controlled  Continue Diovan, metoprolol and Diovan    3  Lower extremity edema:  Unknown etiology  Could be due to diastolic CHF and pulmonary hypertension  She had concentric left ventricle hypertrophy and mild pulmonary hypertension 3 years ago, will repeat echocardiogram to evaluate for progression  Continue Lasix for now

## 2018-03-05 PROBLEM — E78.5 HYPERLIPIDEMIA: Status: ACTIVE | Noted: 2018-01-01

## 2018-03-05 NOTE — PROGRESS NOTES
Assessment/Plan:    Hypertension  Well controlled bmp wnl    Left ventricular hypertrophy  Agree with plan for repeat echo  Follow with Cardiology    Paroxysmal supraventricular tachycardia ([facility] [address] )  She has been symptom free on metorprolol    Leukopenia  Fluctuating wbs which normalizes  Will follow     Obesity  She has been using the treadmill and has been watching her intake    Hyperlipidemia  ldl at goal  She has chronically elevated CK unrelated to her statin  Agree with restarting lipitor at 20 mg daily       Diagnoses and all orders for this visit:    Paroxysmal supraventricular tachycardia ([facility] [address] )    Mixed hyperlipidemia  -     Comprehensive metabolic panel; Future  -     CK; Future  -     Lipid Panel with Direct LDL reflex; Future  -     TSH, 3rd generation; Future    Lymphocytopenia  -     CBC and differential; Future    Essential hypertension  -     Comprehensive metabolic panel; Future  -     TSH, 3rd generation; Future    Left ventricular hypertrophy    Other orders  -     calcium citrate-vitamin D (CITRACAL+D) 315-200 MG-UNIT per tablet; Take 1 tablet by mouth daily  -     meloxicam (MOBIC) 7 5 mg tablet; Take 1 tablet by mouth daily as needed  -     atorvastatin (LIPITOR) 20 mg tablet; Take 1 tablet by mouth daily          Subjective:      Patient ID: Travis Quinteros is a 69 y o  female  Patient presents in follow up for her medical problems and to review recent lab work  She has established with a new cardiologist who noted lvh on a prior echo and wanted to repeat that and will do that later this month   She has some joint pain which she says are mild and when they are bad she takes mobic with releif        The following portions of the patient's history were reviewed and updated as appropriate: allergies, current medications, past family history, past medical history, past social history, past surgical history and problem list     Review of Systems   Constitutional: Negative for activity change, appetite change, chills, diaphoresis, fatigue, fever and unexpected weight change  HENT: Negative for congestion, dental problem, drooling, ear discharge, ear pain, facial swelling, hearing loss, mouth sores, nosebleeds, postnasal drip, rhinorrhea, sinus pain, sinus pressure, sneezing, sore throat, tinnitus, trouble swallowing and voice change  Eyes: Negative for photophobia, pain, discharge, redness, itching and visual disturbance  Respiratory: Negative for apnea, cough, choking, chest tightness, shortness of breath, wheezing and stridor  Cardiovascular: Negative for chest pain, palpitations and leg swelling  Gastrointestinal: Negative for abdominal distention, abdominal pain, anal bleeding, blood in stool, constipation, diarrhea, nausea, rectal pain and vomiting  Endocrine: Negative for cold intolerance, heat intolerance, polydipsia, polyphagia and polyuria  Genitourinary: Negative for decreased urine volume, difficulty urinating, dysuria, enuresis, flank pain, frequency, genital sores, hematuria and urgency  Musculoskeletal: Negative for back pain, gait problem, joint swelling, myalgias, neck pain and neck stiffness  Skin: Negative for color change, pallor, rash and wound  Allergic/Immunologic: Negative for environmental allergies, food allergies and immunocompromised state  Neurological: Negative for dizziness, tremors, seizures, syncope, facial asymmetry, speech difficulty, weakness, light-headedness, numbness and headaches  Hematological: Negative for adenopathy  Does not bruise/bleed easily  Psychiatric/Behavioral: Negative for agitation, self-injury, sleep disturbance and suicidal ideas  The patient is not nervous/anxious  Objective:      /74   Pulse 87   Ht 5' 6" (1 676 m)   Wt 127 kg (280 lb 6 4 oz)   SpO2 97%   BMI 45 26 kg/m²          Physical Exam   Constitutional: She is oriented to person, place, and time  She appears well-developed and well-nourished  No distress  HENT:   Head: Normocephalic and atraumatic  Right Ear: External ear normal    Left Ear: External ear normal    Mouth/Throat: Oropharynx is clear and moist    Eyes: Conjunctivae and EOM are normal  Pupils are equal, round, and reactive to light  No scleral icterus  Neck: Normal range of motion  Neck supple  No JVD present  No tracheal deviation present  No thyromegaly present  Cardiovascular: Normal rate, regular rhythm and intact distal pulses  Exam reveals no gallop and no friction rub  No murmur heard  Pulmonary/Chest: Effort normal and breath sounds normal  No respiratory distress  She has no wheezes  She has no rales  She exhibits no tenderness  Abdominal: Soft  Bowel sounds are normal  She exhibits no distension and no mass  There is no tenderness  There is no rebound and no guarding  Musculoskeletal: Normal range of motion  She exhibits no edema, tenderness or deformity  Lymphadenopathy:     She has no cervical adenopathy  Neurological: She is alert and oriented to person, place, and time  She has normal reflexes  No cranial nerve deficit  Coordination normal    Skin: Skin is warm and dry  No rash noted  She is not diaphoretic  No erythema  No pallor  Psychiatric: She has a normal mood and affect   Her behavior is normal  Judgment and thought content normal

## 2018-03-05 NOTE — ASSESSMENT & PLAN NOTE
ldl at goal  She has chronically elevated CK unrelated to her statin   Agree with restarting lipitor at 20 mg daily

## 2018-04-16 NOTE — TELEPHONE ENCOUNTER
Metoprolol 100 mg QD  Lasix 40 mg QD  Mobic 7 5 mg      1 st 2 med's were prescribed by Dr Neysa Apley and now needs them prescribed by Dr K[facility]  Katie Simmons

## 2018-05-09 NOTE — TELEPHONE ENCOUNTER
Pt cld stating that she needs her TOPROL filled and sent to [facility]  She only has a couple left

## 2018-07-06 PROBLEM — N17.9 ACUTE KIDNEY INJURY (HCC): Status: ACTIVE | Noted: 2018-01-01

## 2018-07-06 PROBLEM — I21.4 NSTEMI (NON-ST ELEVATED MYOCARDIAL INFARCTION) (HCC): Status: ACTIVE | Noted: 2018-01-01

## 2018-07-06 PROBLEM — I50.9 ACUTE CHF (CONGESTIVE HEART FAILURE) (HCC): Status: ACTIVE | Noted: 2018-01-01

## 2018-07-06 NOTE — ED PROVIDER NOTES
History  Chief Complaint   Patient presents with    Shortness of Breath     Pt brought via EMS for evaluation of SOB and b/l leg swelling x 1 week   Leg Swelling     HPI patient is a 56-year-old female, reports a history of hypertension on Lasix, reports over last few weeks her legs have been swelling, slowly increasing over the last 7 days  She reports that today she has been increasingly short of breath  Patient reports over last few days she has had some difficulty climbing steps but today as she tried to climb steps she got acutely short of breath  Patient apparently called EMS  She denies any chest pain  There is no loss of consciousness  Patient did not fall  Patient primarily complains of severe shortness of breath with exertion  Patient reports she feels improved now that she is not climbing the steps anymore  Again she denies ever having any chest pain  Past medical history hypertension, hyperlipidemia, colon lesion  Family history noncontributory  Social history nonsmoker no history of drug abuse  Prior to Admission Medications   Prescriptions Last Dose Informant Patient Reported? Taking?    Multiple Vitamin (MULTIVITAMIN) tablet 7/6/2018 at Unknown time Self Yes Yes   Sig: Take 1 tablet by mouth daily   Omega-3 Fatty Acids (FISH OIL) 1,000 mg 7/6/2018 at Unknown time Self Yes Yes   Sig: Take 1,000 mg by mouth 2 (two) times a day   aspirin (ECOTRIN LOW STRENGTH) 81 mg EC tablet 7/6/2018 at Unknown time Self Yes Yes   Sig: Take 81 mg by mouth daily   atorvastatin (LIPITOR) 20 mg tablet 7/5/2018 at Unknown time  No Yes   Sig: Take 1 tablet (20 mg total) by mouth daily   Patient taking differently: Take 40 mg by mouth daily     calcium citrate-vitamin D (CITRACAL+D) 315-200 MG-UNIT per tablet 7/6/2018 at Unknown time  Yes Yes   Sig: Take 1 tablet by mouth daily   cholecalciferol (VITAMIN D3) 1,000 units tablet 7/6/2018 at Unknown time Self Yes Yes   Sig: Take 1,000 Units by mouth daily furosemide (LASIX) 40 mg tablet 7/6/2018 at Unknown time  No Yes   Sig: Take 1 tablet (40 mg total) by mouth daily   meloxicam (MOBIC) 7 5 mg tablet Past Week at Unknown time  No Yes   Sig: TAKE 1 TABLET DAILY   metoprolol succinate (TOPROL-XL) 100 mg 24 hr tablet 7/6/2018 at Unknown time  No Yes   Sig: Take 1 tablet (100 mg total) by mouth daily   valsartan-hydrochlorothiazide (DIOVAN HCT) 80-12 5 MG per tablet 7/6/2018 at Unknown time Self Yes Yes   Sig: Take 1 tablet by mouth daily      Facility-Administered Medications: None       Past Medical History:   Diagnosis Date    Anemia     Arthropathy     Benign neoplasm of sigmoid colon     Hyperlipidemia     Hypertension     Lower extremity edema     Nontoxic goiter     Obesity     Paroxysmal supraventricular tachycardia (HCC)     Tachycardia     Thrombocytopenia (HCC)     Thyromegaly     Uterine leiomyoma        Past Surgical History:   Procedure Laterality Date    BACK SURGERY      CATARACT EXTRACTION      DILATION AND CURETTAGE OF UTERUS      HIP SURGERY      HYSTERECTOMY      total abdominal hysterectomy with removal of both ovaries    REVISION TOTAL KNEE ARTHROPLASTY      TOTAL KNEE ARTHROPLASTY         Family History   Problem Relation Age of Onset    Heart failure Mother         congestive    Kidney disease Family         chronic,stage    Coronary artery disease Family      I have reviewed and agree with the history as documented  Social History   Substance Use Topics    Smoking status: Never Smoker    Smokeless tobacco: Never Used    Alcohol use Yes      Comment: social alcohol use (as per allscripts)        Review of Systems   Constitutional: Negative for diaphoresis, fatigue and fever  HENT: Negative for congestion, ear pain, nosebleeds and sore throat  Eyes: Negative for photophobia, pain, discharge and visual disturbance  Respiratory: Positive for shortness of breath   Negative for cough, choking, chest tightness and wheezing  Cardiovascular: Negative for chest pain and palpitations  Gastrointestinal: Negative for abdominal distention, abdominal pain, diarrhea and vomiting  Genitourinary: Negative for dysuria, flank pain and frequency  Musculoskeletal: Negative for back pain, gait problem and joint swelling  Skin: Negative for color change and rash  Neurological: Negative for dizziness, syncope and headaches  Psychiatric/Behavioral: Negative for behavioral problems and confusion  The patient is not nervous/anxious  All other systems reviewed and are negative  Dyspnea on exertion    Physical Exam  Physical Exam   Constitutional: She is oriented to person, place, and time  She appears well-developed and well-nourished  HENT:   Head: Normocephalic  Right Ear: External ear normal    Left Ear: External ear normal    Nose: Nose normal    Mouth/Throat: Oropharynx is clear and moist    Eyes: EOM and lids are normal  Pupils are equal, round, and reactive to light  Neck: Normal range of motion  Neck supple  Cardiovascular: Normal rate, regular rhythm, normal heart sounds and intact distal pulses  Pulmonary/Chest: Effort normal  No respiratory distress  She has rales  Bilateral rales and the bases  Musculoskeletal: Normal range of motion  She exhibits no deformity  Neurological: She is alert and oriented to person, place, and time  Skin: Skin is warm and dry  Psychiatric: She has a normal mood and affect  Nursing note and vitals reviewed     Pulse oximetry 99% on room air adequate oxygenation, there is no hypoxia    Vital Signs  ED Triage Vitals [07/06/18 1904]   Temperature Pulse Respirations Blood Pressure SpO2   97 8 °F (36 6 °C) 93 20 98/59 97 %      Temp Source Heart Rate Source Patient Position - Orthostatic VS BP Location FiO2 (%)   Oral Monitor Lying Right arm --      Pain Score       5           Vitals:    07/06/18 1904 07/06/18 2015 07/06/18 2138 07/06/18 2300   BP: 98/59 104/59 112/66 110/68   Pulse: 93 86 95 97   Patient Position - Orthostatic VS: Lying Lying Sitting Lying       Visual Acuity      ED Medications  Medications   aspirin (ECOTRIN LOW STRENGTH) EC tablet 81 mg (not administered)   atorvastatin (LIPITOR) tablet 20 mg (not administered)   cholecalciferol (VITAMIN D3) tablet 1,000 Units (not administered)   metoprolol succinate (TOPROL-XL) 24 hr tablet 100 mg (not administered)   fish oil capsule 1,000 mg (1,000 mg Oral Given 7/6/18 2157)   ondansetron (ZOFRAN) injection 4 mg (not administered)   furosemide (LASIX) injection 60 mg (not administered)   heparin (porcine) subcutaneous injection 5,000 Units (5,000 Units Subcutaneous Not Given 7/6/18 2152)   acetaminophen (TYLENOL) tablet 650 mg (not administered)   furosemide (LASIX) injection 80 mg (80 mg Intravenous Given 7/6/18 2006)       Diagnostic Studies  Results Reviewed     Procedure Component Value Units Date/Time    Troponin I [[id number]]  (Abnormal) Collected:  07/06/18 1919    Lab Status:  Final result Specimen:  Blood from Arm, Left Updated:  07/06/18 2001     Troponin I 0 29 (H) ng/mL     Hepatic function panel [[id number]]  (Normal) Collected:  07/06/18 1919    Lab Status:  Final result Specimen:  Blood from Arm, Left Updated:  07/06/18 1951     Total Bilirubin 0 60 mg/dL      Bilirubin, Direct 0 16 mg/dL      Alkaline Phosphatase 103 U/L      AST 29 U/L      ALT 32 U/L      Total Protein 7 9 g/dL      Albumin 3 7 g/dL     BNP [[id number]]  (Abnormal) Collected:  07/06/18 1919    Lab Status:  Final result Specimen:  Blood from Arm, Left Updated:  07/06/18 1951     NT-proBNP 5,380 (H) pg/mL     Basic metabolic panel [[id number]]  (Abnormal) Collected:  07/06/18 1919    Lab Status:  Final result Specimen:  Blood from Arm, Left Updated:  07/06/18 1945     Sodium 138 mmol/L      Potassium 3 9 mmol/L      Chloride 102 mmol/L      CO2 24 mmol/L      Anion Gap 12 mmol/L      BUN 47 (H) mg/dL      Creatinine 1 81 (H) mg/dL      Glucose 101 mg/dL      Calcium 9 0 mg/dL      eGFR 27 ml/min/1 73sq m     Narrative:         National Kidney Disease Education Program recommendations are as follows:  GFR calculation is accurate only with a steady state creatinine  Chronic Kidney disease less than 60 ml/min/1 73 sq  meters  Kidney failure less than 15 ml/min/1 73 sq  meters  Protime-INR [[id number]]  (Normal) Collected:  07/06/18 1919    Lab Status:  Final result Specimen:  Blood from Arm, Left Updated:  07/06/18 1939     Protime 13 8 seconds      INR 1 07                 XR chest pa & lateral    (Results Pending)              Procedures  ECG 12 Lead Documentation  Date/Time: 7/6/2018 8:11 PM  Performed by: Linda Chino  Authorized by: Linda Chino     Indications / Diagnosis:  Shortness of breath  ECG reviewed by me, the ED Provider: yes    Patient location:  ED  Previous ECG:     Previous ECG:  Compared to current    Comparison ECG info: May 2, 2007    Similarity:  Changes noted  Interpretation:     Interpretation: non-specific    Rate:     ECG rate:  Ninety-two    ECG rate assessment: normal    Rhythm:     Rhythm: sinus rhythm    Comments:      Normal sinus rhythm, nonspecific ST-T wave changes no ST elevations  Phone Contacts  ED Phone Contact    ED Course      chest x-ray showed a enlarged cardiac silhouette, increased markings bilaterally consistent with congestive heart failure, pulmonary edema  Interpreted by me I was initial , patient's cardiac troponin was elevated at 0 29, she had no chest pain I believe this is secondary to cardiac strain  BNP was elevated at 5380 consistent with congestive heart failure  Patient's electrolytes showed a BUN of 47 and creatinine 1 8, mild renal insufficiency,       discussed with the patient, new onset congestive heart failure or worsening congestive heart failure will require IV diuretics and admission she understands                      MDM medical decision making 69year-old female, increasing leg swelling over the last several weeks, now with shortness of breath over this week, today unable to climb steps due to severe shortness of breath  Patient has no chest pain, BNP was elevated consistent with congestive heart failure  The patient is cardiac troponin was elevated consistent with cardiac ischemia probably secondary to strain she has no chest pain  Patient will require IV Lasix diuresis and further evaluation and adjustment of her medications possible cardiac echo  Discussed with the hospitalist service    CritCare Time    Disposition  Final diagnoses:   Congestive heart failure (CHF) ([facility] [address] )     Time reflects when diagnosis was documented in both MDM as applicable and the Disposition within this note     Time User Action Codes Description Comment    7/6/2018  8:22 PM Nohelia Dunn Add [I50 9] [facility]     7/6/2018  8:58 PM Dayan Modi Add [I21 4] NSTEMI (non-ST elevated myocardial infarction) [facility])       ED Disposition     ED Disposition Condition Comment    Admit  Case was discussed with the hospitalist service and the patient's admission status was agreed to be 2 midnights the service of Dr Basil Saini    None         Current Discharge Medication List      CONTINUE these medications which have NOT CHANGED    Details   aspirin (ECOTRIN LOW STRENGTH) 81 mg EC tablet Take 81 mg by mouth daily      atorvastatin (LIPITOR) 20 mg tablet Take 1 tablet (20 mg total) by mouth daily  Qty: 30 tablet, Refills: 3    Associated Diagnoses: Hyperlipidemia, unspecified hyperlipidemia type      calcium citrate-vitamin D (CITRACAL+D) 315-200 MG-UNIT per tablet Take 1 tablet by mouth daily      cholecalciferol (VITAMIN D3) 1,000 units tablet Take 1,000 Units by mouth daily      furosemide (LASIX) 40 mg tablet Take 1 tablet (40 mg total) by mouth daily  Qty: 90 tablet, Refills: 3    Associated Diagnoses: Essential hypertension meloxicam (MOBIC) 7 5 mg tablet TAKE 1 TABLET DAILY  Qty: 90 tablet, Refills: 0    Associated Diagnoses: Left knee pain, unspecified chronicity      metoprolol succinate (TOPROL-XL) 100 mg 24 hr tablet Take 1 tablet (100 mg total) by mouth daily  Qty: 90 tablet, Refills: 0    Associated Diagnoses: Essential hypertension      Multiple Vitamin (MULTIVITAMIN) tablet Take 1 tablet by mouth daily      Omega-3 Fatty Acids (FISH OIL) 1,000 mg Take 1,000 mg by mouth 2 (two) times a day      valsartan-hydrochlorothiazide (DIOVAN HCT) 80-12 5 MG per tablet Take 1 tablet by mouth daily           No discharge procedures on file      ED Provider  Electronically Signed by           Liam Arcos MD  07/07/18 0680

## 2018-07-07 PROBLEM — N18.30 HYPERTENSIVE KIDNEY DISEASE WITH STAGE 3 CHRONIC KIDNEY DISEASE (HCC): Status: ACTIVE | Noted: 2018-01-01

## 2018-07-07 PROBLEM — I12.9 HYPERTENSIVE KIDNEY DISEASE WITH STAGE 3 CHRONIC KIDNEY DISEASE (HCC): Status: ACTIVE | Noted: 2018-01-01

## 2018-07-07 NOTE — PROGRESS NOTES
[facility] Progress Note  Patient: Ángel Vallejo 69 y o  female   MRN: [id number]  PCP: Seble Vega MD  Unit/Bed#: -01 Encounter: [id number]  Date Of Visit: 18    Assessment:    Principal Problem:    Acute CHF (congestive heart failure) ([facility] )  Active Problems:    Hypertension    NSTEMI (non-ST elevated myocardial infarction) ([facility]    Acute kidney injury ([facility] )    Hypertensive kidney disease with stage 3 chronic kidney disease      Plan:    # Acute on chronic diastolic HF  - secondary to non compliance  - Cardiology on board  - Cont lasix, lower bp holding parameters however was held this am  - recent echo in 2018; repeat one pending  - upon d/c will likely d/c HCTZ    # NSTEMI type II - secondary to above  - cardiology on board  - echo pending to r/o wall motion abnormality  - PT is cp free    # DEBO on ckd stg III  - likely secondary to cardio-renal  - Nephrology on board  - Avoid nephrotoxins and renally dose meds    # HTN stable, holding diovan HCT       VTE Pharmacologic Prophylaxis:   Pharmacologic: Heparin  Mechanical VTE Prophylaxis in Place: Yes    Patient Centered Rounds: I have performed bedside rounds with nursing staff today  Discussions with Specialists or Other Care Team Provider:     Education and Discussions with Family / Patient: Patient and her dtr    Time Spent for Care: 30 minutes  More than 50% of total time spent on counseling and coordination of care as described above      Current Length of Stay: 1 day(s)    Current Patient Status: Inpatient   Certification Statement: The patient will continue to require additional inpatient hospital stay due to ADHF, DEBO    Discharge Plan / Estimated Discharge Date:     Code Status: Level 1 - Full Code      Subjective:   Dyspnea with exertion only, she states that she is fine at rest   She denies chest pain    Objective:     Vitals:   Temp (24hrs), Av 9 °F (36 6 °C), Min:97 5 °F (36 4 °C), Max:98 2 °F (36 8 °C)    HR: [55-97] 85  Resp:  [17-20] 18  BP: ()/(58-71) 108/65  SpO2:  [93 %-99 %] 95 %  Body mass index is 44 83 kg/m²  Input and Output Summary (last 24 hours): Intake/Output Summary (Last 24 hours) at 07/07/18 1649  Last data filed at 07/07/18 1327   Gross per 24 hour   Intake              725 ml   Output             1400 ml   Net             -675 ml       Physical Exam:     Physical Exam   Constitutional: She is oriented to person, place, and time  She appears well-developed  Morbidly obese   Neck: Neck supple  Cardiovascular: Normal rate, regular rhythm, normal heart sounds and intact distal pulses  Exam reveals no gallop and no friction rub  No murmur heard  Pulmonary/Chest: No respiratory distress  She has no wheezes  She has rales  She exhibits no tenderness  Bibasilar crackles   Abdominal: Soft  Bowel sounds are normal  She exhibits no distension and no mass  There is no tenderness  There is no rebound and no guarding  Musculoskeletal: Normal range of motion  She exhibits no edema, tenderness or deformity  Neurological: She is alert and oriented to person, place, and time  She has normal reflexes  She displays normal reflexes  No cranial nerve deficit  She exhibits normal muscle tone   Coordination normal        Additional Data:     Labs:      Results from last 7 days  Lab Units 07/07/18  0436   WBC Thousand/uL 7 45   HEMOGLOBIN g/dL 13 1   HEMATOCRIT % 39 9   PLATELETS Thousands/uL 146*   NEUTROS PCT % 60   LYMPHS PCT % 25   MONOS PCT % 12   EOS PCT % 2       Results from last 7 days  Lab Units 07/06/18  2244 07/06/18  1919   SODIUM mmol/L 143 138   POTASSIUM mmol/L 3 5 3 9   CHLORIDE mmol/L 104 102   CO2 mmol/L 26 24   BUN mg/dL 47* 47*   CREATININE mg/dL 1 89* 1 81*   CALCIUM mg/dL 9 0 9 0   TOTAL PROTEIN g/dL  --  7 9   BILIRUBIN TOTAL mg/dL  --  0 60   ALK PHOS U/L  --  103   ALT U/L  --  32   AST U/L  --  29   GLUCOSE RANDOM mg/dL 122 101       Results from last 7 days  Lab Units 07/06/18 1919   INR  1 07       * I Have Reviewed All Lab Data Listed Above  * Additional Pertinent Lab Tests Reviewed: No New Labs Available For Today    Imaging:    Imaging Reports Reviewed Today Include:   Imaging Personally Reviewed by Myself Includes:    Recent Cultures (last 7 days):           Last 24 Hours Medication List:     Current Facility-Administered Medications:  acetaminophen 650 mg Oral Q6H PRN Alee Pena PA-C   aspirin 81 mg Oral Daily Alee Pena PA-C   atorvastatin 20 mg Oral Daily With [employer] MARTIN   cholecalciferol 1,000 Units Oral Daily Alee Pena PA-C   fish oil 1,000 mg Oral BID Alee Pena PA-C   furosemide 40 mg Oral BID (diuretic) Simin Palomino DO   heparin (porcine) 5,000 Units Subcutaneous formerly [facility] Alee Pena PA-C   metoprolol succinate 100 mg Oral Daily Alee Pena PA-C   ondansetron 4 mg Intravenous Q6H PRN Alee Pena PA-C        Today, Patient Was Seen By: Simin Palomino DO    ** Please Note: This note has been constructed using a voice recognition system   **

## 2018-07-07 NOTE — CONSULTS
Consultation - Cardiology Team One  Alexus Gross 69 y o  female MRN: [id number]  Unit/Bed#: -01 Encounter: [id number]    Inpatient consult to Cardiology  Consult performed by: Guilherme Garcia  Consult ordered by: Adry Rose          Physician Requesting Consult: Allyn Mensah DO  Reason for Consult / Principal Problem: CHF, NSTEMI    HPI: Cardiologist Dr Erika Dixon is a 69y o  year old female who has a history of HTN, HLD, chronic diastolic CHF presenting with SOB and leg edema  Patient has SOB and leg swelling at baseline however got acutely worse yesterday  SOB worse with exertion  Patient admits to not taking Lasix as prescribed  Denies chest pain, lightheadedness, palpitations, syncope  REVIEW OF SYSTEMS:  Constitutional:  Denies fever or chills   Eyes:  Denies change in visual acuity   HENT:  Denies nasal congestion or sore throat   Respiratory: +SOB  Cardiovascular:  +b/l LE edema   Denies chest pain   GI:  Denies abdominal pain, nausea, vomiting, bloody stools or diarrhea   :  Denies dysuria, frequency, difficulty in micturition and nocturia  Musculoskeletal:  Denies back pain or joint pain   Neurologic:  Denies headache, focal weakness or sensory changes   Endocrine:  Denies polyuria or polydipsia   Lymphatic:  Denies swollen glands   Psychiatric:  Denies depression or anxiety     Historical Information   Past Medical History:   Diagnosis Date    Anemia     Arthropathy     Benign neoplasm of sigmoid colon     Hyperlipidemia     Hypertension     Lower extremity edema     Nontoxic goiter     Obesity     Paroxysmal supraventricular tachycardia (HCC)     Tachycardia     Thrombocytopenia (HCC)     Thyromegaly     Uterine leiomyoma      Past Surgical History:   Procedure Laterality Date    BACK SURGERY      CATARACT EXTRACTION      DILATION AND CURETTAGE OF UTERUS      HIP SURGERY      HYSTERECTOMY      total abdominal hysterectomy with removal of both ovaries    REVISION TOTAL KNEE ARTHROPLASTY      TOTAL KNEE ARTHROPLASTY       History   Alcohol Use    Yes     Comment: social alcohol use (as per allscripts)     History   Drug Use No     History   Smoking Status    Never Smoker   Smokeless Tobacco    Never Used       Family History:   Family History   Problem Relation Age of Onset    Heart failure Mother         congestive    Kidney disease Family         chronic,stage    Coronary artery disease Family        MEDS & ALLERGIES:  all current active meds have been reviewed and current meds: Current Facility-Administered Medications   Medication Dose Route Frequency    acetaminophen (TYLENOL) tablet 650 mg  650 mg Oral Q6H PRN    aspirin (ECOTRIN LOW STRENGTH) EC tablet 81 mg  81 mg Oral Daily    atorvastatin (LIPITOR) tablet 20 mg  20 mg Oral Daily With Dinner    cholecalciferol (VITAMIN D3) tablet 1,000 Units  1,000 Units Oral Daily    fish oil capsule 1,000 mg  1,000 mg Oral BID    furosemide (LASIX) injection 60 mg  60 mg Intravenous BID (diuretic)    heparin (porcine) subcutaneous injection 5,000 Units  5,000 Units Subcutaneous Q8H [facility] & longterm    metoprolol succinate (TOPROL-XL) 24 hr tablet 100 mg  100 mg Oral Daily    ondansetron (ZOFRAN) injection 4 mg  4 mg Intravenous Q6H PRN        No Known Allergies    OBJECTIVE:  Vitals:   Vitals:    07/07/18 0700   BP: 101/66   Pulse: 80   Resp: 18   Temp: 98 1 °F (36 7 °C)   SpO2: 97%     Body mass index is 44 83 kg/m²      Systolic (30IGT), ZVP:016 , Min:98 , VHY:023     Diastolic (91LFQ), TON:46, Min:59, Max:71      Intake/Output Summary (Last 24 hours) at 07/07/18 0856  Last data filed at 07/07/18 0759   Gross per 24 hour   Intake                0 ml   Output             1400 ml   Net            -1400 ml     Weight (last 2 days)     Date/Time   Weight    07/07/18 0600  126 (793 78)    07/06/18 2138  126 (277 78)            Invasive Devices     Peripheral Intravenous Line            Peripheral IV 07/06/18 Left Forearm less than 1 day                PHYSICAL EXAMS:  General:  Patient is not in acute distress, laying in the bed comfortably, awake, alert responding to commands  Head: Normocephalic, Atraumatic  HEENT: White sclera, pink conjunctiva,  PERRLA,pharynx benign  Neck:  +JVD  Supple, carotids+2/+2 no bruits, thyromegaly, adenopathy  Respiratory: +decreased breath sounds  Cardiovascular:  PMI normal, S1-S2 normal, No  Murmurs, thrills, gallops, rubs   Regular rhythm  GI:  Abdomen soft nontender   No hepatosplenomegaly, adenopathy, ascites,or rebound tenderness  Extremities: +2 edema, normal pulses, no calf tenderness, no joint deformities, no venous disease   Integument:  No skin rashes or ulceration  Lymphatic:  No cervical or inguinal lymphadenopathy  Neurologic:  Patient is awake alert, responding to command, well-oriented to time and place and person moving all extremities    LABORATORY RESULTS:    Results from last 7 days  Lab Units 07/07/18  0028 07/06/18 2244 07/06/18 1919   TROPONIN I ng/mL 0 24* 0 29* 0 29*     CBC with diff:   Results from last 7 days  Lab Units 07/07/18  0436 07/06/18  2244   WBC Thousand/uL 7 45  --    HEMOGLOBIN g/dL 13 1  --    HEMATOCRIT % 39 9  --    MCV fL 85  --    PLATELETS Thousands/uL 146* 152   MCH pg 27 9  --    MCHC g/dL 32 8  --    RDW % 14 4  --    MPV fL 12 6 12 1   NRBC AUTO /100 WBCs 0  --        CMP:  Results from last 7 days  Lab Units 07/06/18 2244 07/06/18 1919   SODIUM mmol/L 143 138   POTASSIUM mmol/L 3 5 3 9   CHLORIDE mmol/L 104 102   CO2 mmol/L 26 24   ANION GAP mmol/L 13 12   BUN mg/dL 47* 47*   CREATININE mg/dL 1 89* 1 81*   GLUCOSE RANDOM mg/dL 122 101   CALCIUM mg/dL 9 0 9 0   AST U/L  --  29   ALT U/L  --  32   ALK PHOS U/L  --  103   TOTAL PROTEIN g/dL  --  7 9   BILIRUBIN TOTAL mg/dL  --  0 60   EGFR ml/min/1 73sq m 25 27       BMP:  Results from last 7 days  Lab Units 07/06/18 2244 07/06/18  1919   SODIUM mmol/L 143 138   POTASSIUM mmol/L 3 5 3 9   CHLORIDE mmol/L 104 102   CO2 mmol/L 26 24   BUN mg/dL 47* 47*   CREATININE mg/dL 1 89* 1 81*   GLUCOSE RANDOM mg/dL 122 101   CALCIUM mg/dL 9 0 9 0         Results from last 7 days  Lab Units 18  1919   NT-PRO BNP pg/mL 5,380*                    Results from last 7 days  Lab Units 18  1919   INR  1 07       Lipid Profile:   Lab Results   Component Value Date    CHOL 148 01/15/2018    CHOL 155 2017    CHOL 254 (H) 2017     Lab Results   Component Value Date    HDL 70 (H) 01/15/2018    HDL 63 (H) 2017    HDL 69 (H) 2017     Lab Results   Component Value Date    LDLCALC 69 01/15/2018    LDLCALC 84 2017    LDLCALC 172 (H) 2017     Lab Results   Component Value Date    TRIG 46 01/15/2018    TRIG 40 2017    TRIG 66 2017       Cardiac testing:   Results for orders placed during the hospital encounter of 18   Echo complete with contrast if indicated    Narrative [place] [id number], [address] [place] [address]  [phone]    Transthoracic Echocardiogram  Limited 2D, M-mode, Doppler, and Color Doppler    Study date:  2018    Patient: Juana Kebede  MR number: [id number]  Account number: [de-identified]  : 1940  Age: 69 years  Gender: Female  Status: Outpatient  [facility]: [facility]  Height: 66 in  Weight: 280 lb  BP: 116/ 52 mmHg    Indications: Hypertensive heart disease  Diagnoses: I10  - Essential (primary) hypertension    Sonographer:  Edouard Vee, LIS  Interpreting Physician:  David Crow MD  Primary Physician:  Rhona Craig MD  Referring Physician:  David Crow MD  Group:  [facility]    SUMMARY    LEFT VENTRICLE:  Systolic function was normal  Ejection fraction was estimated in the range of 55 % to 65 %  There were no regional wall motion abnormalities    Doppler parameters were consistent with abnormal left ventricular relaxation (grade 1 diastolic dysfunction)  MITRAL VALVE:  There was mild annular calcification  AORTIC VALVE:  The valve was trileaflet  Leaflets exhibited normal thickness and normal cuspal separation  There was mild regurgitation  HISTORY: PRIOR HISTORY: Pulmonary HTN  SVT  Risk factors: medication-treated hypercholesterolemia and morbid obesity  PROCEDURE: The study was performed in the [address] [place] [address]  This was a routine study  The transthoracic approach was used  The study included limited 2D imaging, M-mode, complete spectral Doppler, and color Doppler  The  heart rate was 64 bpm, at the start of the study  Images were obtained from the parasternal, apical, subcostal, and suprasternal notch acoustic windows  This was a technically difficult study  LEFT VENTRICLE: Size was normal  Systolic function was normal  Ejection fraction was estimated in the range of 55 % to 65 %  There were no regional wall motion abnormalities  Wall thickness was normal  DOPPLER: Doppler parameters were  consistent with abnormal left ventricular relaxation (grade 1 diastolic dysfunction)  RIGHT VENTRICLE: The size was normal  Systolic function was normal  Wall thickness was normal     LEFT ATRIUM: Size was normal     RIGHT ATRIUM: Size was normal     MITRAL VALVE: There was mild annular calcification  Valve structure was normal  There was normal leaflet separation  DOPPLER: The transmitral velocity was within the normal range  There was no evidence for stenosis  There was no  regurgitation  AORTIC VALVE: The valve was trileaflet  Leaflets exhibited normal thickness and normal cuspal separation  The valve was not well visualized  DOPPLER: Transaortic velocity was within the normal range  There was no evidence for stenosis  There was mild regurgitation  TRICUSPID VALVE: The valve structure was normal  There was normal leaflet separation  DOPPLER: The transtricuspid velocity was within the normal range   There was no evidence for stenosis  There was no regurgitation  PULMONIC VALVE: Leaflets exhibited normal thickness, no calcification, and normal cuspal separation  DOPPLER: The transpulmonic velocity was within the normal range  There was no regurgitation  PERICARDIUM: There was no pericardial effusion  The pericardium was normal in appearance  AORTA: The root exhibited normal size  SYSTEMIC VEINS: IVC: The inferior vena cava was normal in size and course  Respirophasic changes were normal     SYSTEM MEASUREMENT TABLES    Apical four chamber  4 chamber Left Atrium Volume Index; Planimetry; End Systole; Apical four chamber;: 14 6 cm2  Left Ventricular Ejection Fraction; Method of Disks, Single Plane; Apical four chamber;: 69 8 %  Left Ventricular End Diastolic Volume; Method of Disks, Single Plane; Apical four chamber;: 83 9 ml  Left Ventricular End Systolic Volume; Method of Disks, Single Plane; Apical four chamber;: 25 3 ml  Right Atrium Systolic Area; Planimetry; End Systole; Apical four chamber;: 14 86 cm2  Right Ventricular Internal Diastolic Dimension; End Diastole; Apical four chamber;: 34 1 mm  TAPSE: 24 3 mm    Unspecified Scan Mode  AR Vmax; Regurgitant Flow; Diastole;: 423 2 cm/s  Gradient Pressure, Peak; Simplified Bernoulli; Antegrade Flow; Systole;: 9 4 mm[Hg]  Gradient pressure, average; Simplified Bernoulli; Antegrade Flow; Systole;: 4 8 mm[Hg]  Pressure Half Time;: 0 69 s  Mitral Valve Area; Area by Pressure Half-Time; Systole;: 3 24 cm2  Mitral Valve E to A Ratio; Systole;: 0 68  Maximum Tricuspid valve regurgitation pressure gradient; Regurgitant Flow; Systole;: 25 3 mm[Hg]    [facility] Accredited Ech[facility] Laboratory    Prepared and electronically signed by    Angela Page MD  Signed 05-Apr-2018 18:20:49         Imaging:   I have personally reviewed pertinent reports        EKG reviewed personally:  T wave abnormalities in inferior and anterior leads    Assessment/Plan:  1  Acute on chronic diastolic CHF:  -Volume overloaded on exam although improved  Likely secondary to medication noncompliance  -BNP 5380  -CXR unremarkable  -ECHO shows EF 55-65%, no regional wall motion abnormalities, G1DD  -Volume status improved  Will switch to PO lasix  -Cr 1 89  Will consult nephrology  -Low salt diet, daily weights, I/O     2  NSTEMI likely type 2: Serial troponins 0 29, 0 29, 0 24  Trend flat  In the setting of CHF exacerbation  2  HTN: Stable, continue present regimen  3  HLD: Continue statin    Code Status: Level 1 - Full Code    Counseling / Coordination of Care  Total floor / unit time spent today 35 minutes  Greater than 50% of total time was spent with the patient and / or family counseling and / or coordination of care  A description of the counseling / coordination of care: Review of history, current assessment, development of a plan      Raffy Edward PA-C  7/7/2018,8:56 AM

## 2018-07-07 NOTE — CONSULTS
NEPHROLOGY CONSULTATION NOTE    Patient: Ángel Vallejo               Sex: female          DOA: 7/6/2018  7:01 PM   YOB: 1940        Age:  69 y o         LOS:  LOS: 1 day     REFERRING PHYSICIAN: Mariposa Lyman PA-C     REASON FOR THE REFERRAL / CONSULTATION:  Further management of DEBO    DATE OF CONSULTATION / SERVICE: 7/7/2018    ADMISSION DIAGNOSIS: Acute CHF (congestive heart failure) ([facility] [address] )     CHIEF COMPLAINT     I have shortness of breath    HPI     This is a 27-year-old female with a past medical history of underlying hypertension, hyperlipidemia, admitted for further management of shortness of breath  On admission patient was found to have elevated creatinine of 1 89 and Nephrology were consulted for further management of DEBO  Patient was admitted with shortness of breath with minimal activity which was slowly getting worse and was diagnosed with acute congestive heart failure and had received IV diuretics on admission  Overnight patient's breathing has improved some but continued to complain of shortness of breath upon minimal exertion  Upon review of old medical record patient's previously known baseline creatinine is around 1 1-1 2  Patient's daughter was also present at the time of consultation and history was also obtained from her and all the questions were answered  Patient does have a history of hypertension which was under well controlled at home  Due to DEBO Diovan and HCTZ is currently on hold  Patient is currently on metoprolol  mg p o  Daily  Patient does have a history of hyperlipidemia which is also under control with the use of atorvastatin  Currently patient denies nausea, vomiting, headache, dizziness, abdominal pain, constipation or rash      PAST MEDICAL HISTORY     Past Medical History:   Diagnosis Date    Anemia     Arthropathy     Benign neoplasm of sigmoid colon     Hyperlipidemia     Hypertension     Lower extremity edema     Nontoxic goiter     Obesity     Paroxysmal supraventricular tachycardia (HCC)     Tachycardia     Thrombocytopenia (HCC)     Thyromegaly     Uterine leiomyoma        PAST SURGICAL HISTORY     Past Surgical History:   Procedure Laterality Date    BACK SURGERY      CATARACT EXTRACTION      DILATION AND CURETTAGE OF UTERUS      HIP SURGERY      HYSTERECTOMY      total abdominal hysterectomy with removal of both ovaries    REVISION TOTAL KNEE ARTHROPLASTY      TOTAL KNEE ARTHROPLASTY         ALLERGIES     No Known Allergies    SOCIAL HISTORY     History   Alcohol Use    Yes     Comment: social alcohol use (as per allscripts)     History   Drug Use No     History   Smoking Status    Never Smoker   Smokeless Tobacco    Never Used       FAMILY HISTORY     Family History   Problem Relation Age of Onset    Heart failure Mother         congestive    Kidney disease Family         chronic,stage    Coronary artery disease Family        CURRENT MEDICATIONS       Current Facility-Administered Medications:     acetaminophen (TYLENOL) tablet 650 mg, 650 mg, Oral, Q6H PRN, LESTER Jolly-CRISTEL    aspirin (ECOTRIN LOW STRENGTH) EC tablet 81 mg, 81 mg, Oral, Daily, LESTER Jolly-C    atorvastatin (LIPITOR) tablet 20 mg, 20 mg, Oral, Daily With Dinner, LESTER Jolly-C    cholecalciferol (VITAMIN D3) tablet 1,000 Units, 1,000 Units, Oral, Daily, LESTER Jolly-CRISTEL    fish oil capsule 1,000 mg, 1,000 mg, Oral, BID, LESTER Jolly-C, 1,000 mg at 07/06/18 2157    furosemide (LASIX) tablet 40 mg, 40 mg, Oral, BID (diuretic), Dilan Flanagan PA-C    heparin (porcine) subcutaneous injection 5,000 Units, 5,000 Units, Subcutaneous, Q8H [facility] **AND** Platelet count, , , Once, Renuka Lopez PA-C    metoprolol succinate (TOPROL-XL) 24 hr tablet 100 mg, 100 mg, Oral, Daily, LESTER Jolly-CRISTEL    ondansetron (ZOFRAN) injection 4 mg, 4 mg, Intravenous, Q6H PRN, Renuka Lopez MARTIN    REVIEW OF SYSTEMS     Complete 10 points of review of systems were obtained and discussed in length with patient today  Complete 10 points of review of systems were negative/unremarkable except mentioned in the HPI section  OBJECTIVE     Current Weight: Weight - Scale: 126 kg (277 lb 12 5 oz)  Vitals:    07/07/18 1100   BP: 106/60   Pulse: 88   Resp: 18   Temp:    SpO2: 95%     Body mass index is 44 83 kg/m²  Intake/Output Summary (Last 24 hours) at 07/07/18 1108  Last data filed at 07/07/18 0759   Gross per 24 hour   Intake                0 ml   Output             1400 ml   Net            -1400 ml       PHYSICAL EXAMINATION     Physical Exam   Constitutional: No distress  Obese   HENT:   Head: Normocephalic and atraumatic  Eyes: Conjunctivae are normal  No scleral icterus  Right eye exhibits normal extraocular motion  Left eye exhibits normal extraocular motion  Neck: Neck supple  No JVD present  Cardiovascular: Normal rate, S1 normal and S2 normal     Pulmonary/Chest: No accessory muscle usage  No respiratory distress  She has no wheezes  Abdominal: Soft  She exhibits no distension  Musculoskeletal:        Right ankle: She exhibits swelling  Left ankle: She exhibits swelling  Right hand: She exhibits no laceration  Left hand: She exhibits no laceration  Lymphadenopathy:        Right cervical: No superficial cervical adenopathy present  Left cervical: No superficial cervical adenopathy present  Right: No supraclavicular adenopathy present  Left: No supraclavicular adenopathy present  Neurological: She is alert  She is not disoriented  Skin: Skin is warm  No laceration noted  No cyanosis  Psychiatric: She is not combative  She does not exhibit a depressed mood  She expresses no suicidal ideation           LAB RESULTS          Results from last 7 days  Lab Units 07/07/18  0436 07/06/18  2244 07/06/18  1919   WBC Thousand/uL 7 45  --   -- HEMOGLOBIN g/dL 13 1  --   --    HEMATOCRIT % 39 9  --   --    PLATELETS Thousands/uL 146* 152  --    SODIUM mmol/L  --  143 138   POTASSIUM mmol/L  --  3 5 3 9   CHLORIDE mmol/L  --  104 102   CO2 mmol/L  --  26 24   BUN mg/dL  --  47* 47*   CREATININE mg/dL  --  1 89* 1 81*   EGFR ml/min/1 73sq m  --  25 27   CALCIUM mg/dL  --  9 0 9 0   TOTAL PROTEIN g/dL  --   --  7 9   GLUCOSE RANDOM mg/dL  --  122 101       I have personally reviewed the old medical records and patient's previously known baseline creatinine level is ~ 1 1-1 2    RADIOLOGY RESULTS     Results for orders placed during the hospital encounter of 07/06/18   XR chest pa & lateral:  I have personally reviewed the images of the chest x-ray along with radiology report  Narrative CHEST     INDICATION:   Shortness of breath, leg swelling  COMPARISON:  4/17/2007    EXAM PERFORMED/VIEWS:  XR CHEST AP & LATERAL      FINDINGS:    Cardiac silhouette is probably top normal accounting for technique with mildly uncoiled thoracic aorta  The lungs are clear  No pneumothorax or pleural effusion  Degenerative changes of the spine  Impression No acute cardiopulmonary disease  Workstation performed: [id number]       2D echocardiogram done on 4/5/2018 showed EF of 55-65% with grade 1 diastolic dysfunction  PLAN / RECOMMENDATIONS      1  DEBO on top of CKD stage 3  Multifactorial and suspected due to underlying cardiorenal syndrome  Upon review of old medical record patient's previously known baseline creatinine was around 1 1-1 2  Patient's current creatinine is 1 89  Patient was admitted with shortness of breath and is currently being treated for acute congestive heart failure  Plan to check urine lytes to further evaluate the etiology of DEBO  Check frequent bladder scan to rule out urinary retention  Patient has received 80 mg of IV Lasix on admission and currently on Lasix 40 mg p o   BID which I will plan to continue for time being   Plan to recheck renal function along with BNP in a m  Jw Booker Plan to hold off valsartan for time being  2   Hypertension in chronic kidney disease  Currently blood pressure is controlled and at goal   Diovan and HCTZ is on hold  Continue to monitor hypertension with metoprolol  mg p o  daily  Thank you for the consultation to participate in patient's care  I have personally discussed my plan with the referring physician  Kim Collado MD  Nephrology  7/7/2018        Portions of the record may have been created with voice recognition software  Occasional wrong word or "sound a like" substitutions may have occurred due to the inherent limitations of voice recognition software  Read the chart carefully and recognize, using context, where substitutions have occurred

## 2018-07-07 NOTE — ASSESSMENT & PLAN NOTE
Admit telemetry  Trend troponin  Cardiology consult  Echo pending  Lasix 60 mg IV b i d   I&O  Daily weights

## 2018-07-08 NOTE — PROCEDURES
Central Line Insertion  Date/Time: 7/8/2018 11:13 AM  Performed by: Sylvester Shepherd by: Ally Oro     Patient location:  Bedside  Consent:     Consent obtained:  Emergent situation  Universal protocol:     Procedure explained and questions answered to patient or proxy's satisfaction: no      Relevant documents present and verified: no      Test results available and properly labeled: no      Radiology Images displayed and confirmed  If images not available, report reviewed: no      Required blood products, implants, devices, and special equipment available: no      Site/side marked: no      Immediately prior to procedure, a time out was called: no      Patient identity confirmed:  Hospital-assigned identification number  Pre-procedure details:     Hand hygiene: Hand hygiene performed prior to insertion      Skin preparation:  ChloraPrep    Skin preparation agent: Skin preparation agent completely dried prior to procedure    Indications:     Central line indications: medications requiring central line and hemodynamic monitoring      Site selection rationale:  Ongoing chest compressions during cardiac arrest  Anesthesia (see MAR for exact dosages): Anesthesia method:  None  Procedure details:     Location:  Right femoral    Vessel type: vein      Laterality:  Right    Approach: open technique used      Patient position:  Flat    Catheter size:  7 5 Fr    Landmarks identified: yes      Ultrasound guidance: no      Number of attempts:  1    Successful placement: yes    Post-procedure details:     Post-procedure:  Dressing applied and line sutured    Assessment:  Blood return through all ports and free fluid flow    Post-procedure complications: hemorrhage      Patient tolerance of procedure:   Tolerated well, no immediate complications

## 2018-07-08 NOTE — PROGRESS NOTES
Critical Care Interval Progress Note     Radha Daily 69 y o  female MRN: [id number]    Unit/Bed#:  Encounter: [id number]    Impression:  Principal Problem:    Acute CHF (congestive heart failure) ([facility])  Active Problems:    Hypertension    NSTEMI (non-ST elevated myocardial infarction) ([facility] [address] )    Acute kidney injury ([facility] )    Hypertensive kidney disease with stage 3 chronic kidney disease  Resolved Problems:    * No resolved hospital problems  *        Assessment/ Plan:  1  Status post cardiac arrest-etiology not entirely clear, however given acute onset and right ventricle dilation noted on bedside echo, likely secondary to acute PE  TPA given  Will send for CT of head, chest, abdomen, and pelvis once stable  Will check serial labs  Continue pressors as needed  Will initiate targeted temperature management  2   Acute encephalopathy-secondary to cardiac arrest-will attempt to hold on any sedation until able to obtain neurologic exam   3   Lactic acidosis-secondary to cardiac arrest-continue fluid resuscitation and trend endpoints  Will send procalcitonin to evaluate for infectious etiology, although seems unlikely at this time  Elevation in white blood cell count status post arrest is likely reactive in nature  4   Acute hypoxic and hypercarbic respiratory failure-multifactorial-secondary to likely PE along with cardiac arrest and acute encephalopathy-maintain on bed for now, follow ABGs and make changes as needed  5  Acute kidney injury on chronic kidney disease stage 3-will monitor creatinine closely, in light of cardiac arrest   Nephrology following  Avoid nephrotoxins and renally dose medications  6   Acute on chronic diastolic heart failure-stat echo pending, diuresis stopped secondary to cardiovascular collapse from cardiac arrest and pressor requirements  Cardiology following and was at bedside during arrest   No acute interventions needed    Patient was not a candidate for any type of revascularization  7   NSTEMI type 2-likely secondary to acute on chronic diastolic heart failure-prior to event patient was chest pain-free, will continue to trend troponins  8   Disposition-will continue ICU level of care  Her son was present during most of the resuscitation efforts and was updated  The remainder of her family is coming and will be updated as well  Counseling / Coordination of Care: Total Critical Care time spent 45 minutes excluding procedures, teaching and family updates  ______________________________________________________________________    Chief Complaint:  Shortness of breath    Recent Events / Nursing Concern: The patient is a 50-year-old female who presented to the hospital 2 days ago with complaints of shortness of breath  She was being treated for acute on chronic diastolic heart failure with Lasix  Earlier this morning she was stable and attempted ambulation to the bathroom in order to wash up  When ambulating back to bed from the bathroom she became acutely short of breath and anxious with significant dyspnea and increased work of breathing  Critical care was asked to come to bedside and I immediately presented to evaluate the patient  She was significantly diaphoretic and dyspneic with significant work of breathing and was anxious  She was immediately placed on a non-rebreather mask and was satting 98%  She was also given 80 mg of Lasix and 2 mg of morphine  She was then placed on BiPAP which she was unable to tolerate due to her anxiety  She was emergently transferred to the intensive care unit for intubation  Upon arrival to the ICU she was being transferred over to the bed when she acutely decompensated and did not have a pulse  A code blue was called and ACLS protocol was immediately implemented and followed  Please see code record for all details    She did have return of [place] and required multiple vasopressors for support of her blood pressure  Vitals:   Vitals:    18 2300 18 0300 18 0700 18 1131   BP: 114/67 118/66 110/60    BP Location: Left arm Left arm Right arm    Pulse: (!) 113 95 102    Resp: 22 20 20    Temp: 97 8 °F (36 6 °C) 98 7 °F (37 1 °C) 97 8 °F (36 6 °C)    TempSrc: Oral Axillary Oral    SpO2: 95% 92% 95% (!) 89%   Weight:       Height:                 Temperature: Temp (24hrs), Av 1 °F (36 7 °C), Min:97 8 °F (36 6 °C), Max:98 7 °F (37 1 °C)  Current: Temperature: 97 8 °F (36 6 °C)    Hemodynamic Monitoring:  N/A       Respiratory:  SpO2: SpO2: 100 %  O2 Flow Rate (L/min): 2 L/min    Physical Exam:  Physical Exam   Constitutional: She is oriented to person, place, and time  She appears well-developed and well-nourished  She appears distressed  HENT:   Head: Normocephalic and atraumatic  Eyes: Conjunctivae are normal  Pupils are equal, round, and reactive to light  Neck: Normal range of motion  Neck supple  No JVD present  No thyromegaly present  Cardiovascular: Regular rhythm, normal heart sounds and normal pulses  Tachycardia present  Exam reveals no gallop and no friction rub  No murmur heard  Pulmonary/Chest: Accessory muscle usage present  Tachypnea noted  She is in respiratory distress  She has rhonchi  She has rales  Abdominal: Soft  Bowel sounds are normal  She exhibits no distension  There is no tenderness  Genitourinary:   Genitourinary Comments: Deferred   Musculoskeletal: She exhibits no edema  Lymphadenopathy:     She has no cervical adenopathy  Neurological: She is alert and oriented to person, place, and time  No cranial nerve deficit  GCS eye subscore is 4  GCS verbal subscore is 5  GCS motor subscore is 6  Skin: Skin is warm  No rash noted  She is diaphoretic  No erythema  Psychiatric: Her mood appears anxious  She is agitated           Allergies: No Known Allergies    Medications:   Scheduled Meds:  Current Facility-Administered Medications:  acetaminophen 650 mg Oral Q6H PRN Ruddy Sarmiento PA-C   albumin human 25 g Intravenous Once EUGENE Vogel   alteplase (ACTIVASE) IV infusion (PE) 100 mg Intravenous Once EUGENE Vogel   atorvastatin 20 mg Oral Daily With Anurag Quick PA-C   cholecalciferol 1,000 Units Oral Daily Ruddy Sarmiento PA-C   epinephrine 1-20 mcg/min Intravenous Titrated Lupe Gowers, MD   furosemide 80 mg Intravenous Once [address], DO   morphine injection 2 mg Intravenous Once EUGENE Vogel   norepinephrine 1-30 mcg/min Intravenous Titrated Lupe Gowers, MD   ondansetron 4 mg Intravenous Q6H PRN Ruddy Sarmiento PA-C     Continuous Infusions:  epinephrine 1-20 mcg/min   norepinephrine 1-30 mcg/min     PRN Meds:    acetaminophen 650 mg Q6H PRN   ondansetron 4 mg Q6H PRN       Labs:     Results from last 7 days  Lab Units 07/08/18 0440 07/07/18 0436 07/06/18 2244   WBC Thousand/uL 6 62 7 45  --    HEMOGLOBIN g/dL 13 1 13 1  --    HEMATOCRIT % 39 8 39 9  --    PLATELETS Thousands/uL 124* 146* 152   NEUTROS PCT % 59 60  --    MONOS PCT % 12 12  --        Results from last 7 days  Lab Units 07/08/18 0440 07/06/18 2244 07/06/18 1919   SODIUM mmol/L 138 143 138   POTASSIUM mmol/L 3 4* 3 5 3 9   CHLORIDE mmol/L 104 104 102   CO2 mmol/L 24 26 24   BUN mg/dL 52* 47* 47*   CREATININE mg/dL 1 87* 1 89* 1 81*   CALCIUM mg/dL 8 5 9 0 9 0   TOTAL PROTEIN g/dL  --   --  7 9   BILIRUBIN TOTAL mg/dL  --   --  0 60   ALK PHOS U/L  --   --  103   ALT U/L  --   --  32   AST U/L  --   --  29   GLUCOSE RANDOM mg/dL 118 122 101                Results from last 7 days  Lab Units 07/06/18 1919   INR  1 07           0  Lab Value Date/Time   TROPONINI 0 24 (H) 07/07/2018 0028   TROPONINI 0 29 (H) 07/06/2018 2244   TROPONINI 0 29 (H) 07/06/2018 1919           Diagnostic Imaging / Data:  Imaging studies and echo pending   EKG:  Sinus tach  Code Status: Level 1 - Full Code    Portions of the record may have been created with voice recognition software  Occasional wrong word or "sound a like" substitutions may have occurred due to the inherent limitations of voice recognition software  Read the chart carefully and recognize, using context, where substitutions have occurred      SIGNATURE: EUGENE STAUFFER  DATE: July 8, 2018  TIME: 12:15 PM

## 2018-07-08 NOTE — PROGRESS NOTES
[facility] Progress Note  Patient: Isadora Marinelli 69 y o  female   MRN: [id number]  PCP: Alicia Medina MD  Unit/Bed#: -01 Encounter: [id number]  Date Of Visit: 07/08/18    Assessment:    Principal Problem:    Acute CHF (congestive heart failure) ([facility] )  Active Problems:    Hypertension    NSTEMI (non-ST elevated myocardial infarction) ([facility] )    Acute kidney injury ([place] [address] )    Hypertensive kidney disease with stage 3 chronic kidney disease      Plan:    # Acute hypoxic respiratory failure; cardiopulm arrest  - s/p emergent echo at bedside per cardiology highly suspicious for acute PE given RV failure  - TPA will be initiated  - on vent support  - Contd care per ICU team  - Family made aware and updated     VTE Pharmacologic Prophylaxis:   Pharmacologic: Heparin  Mechanical VTE Prophylaxis in Place: Yes    Patient Centered Rounds: I have performed bedside rounds with nursing staff today  Discussions with Specialists or Other Care Team Provider: ICU, critical care    Education and Discussions with Family / Patient:     Time Spent for Care: 30 minutes  More than 50% of total time spent on counseling and coordination of care as described above  Current Length of Stay: 2 day(s)    Current Patient Status: Inpatient   Certification Statement: The patient will continue to require additional inpatient hospital stay due to Acute PE, cardiopulm arrest    Discharge Plan / Estimated Discharge Date:     Code Status: Level 1 - Full Code      Subjective:   Initially pt seen in am, no acute events overnight  Pt reported feeling well much improved, denied dyspnea, plan per cardiology was to ambulate and monitor her  Later on called per rn, while pt was washing up in the bathroom became SOB  Immediately went to examine pt was found to be in acute hypoxic respiratory failure, she had desaturated to 70's  She c/o SOB but denied CP  Gave dose of lasix IV and called ICU    ICU team at bedside trial of bipap however pt extremely anxious was unable to keep mask on thus she was immediately transferred to ICU for plans to intubate  Upon arrival to ICU, pt became unresponsive a round of CPR regained pulse  Moments again became pulsesless cpr was initiated and pulse regained  Cardiology now at bedside, emergent echo at bedside revealing acute RV failure suspicious for PE  TPA to be initiated  Objective:     Vitals:   Temp (24hrs), Av 1 °F (36 7 °C), Min:97 8 °F (36 6 °C), Max:98 7 °F (37 1 °C)    HR:  [] 102  Resp:  [18-22] 20  BP: ()/(57-70) 110/60  SpO2:  [92 %-98 %] 95 %  Body mass index is 44 83 kg/m²  Input and Output Summary (last 24 hours): Intake/Output Summary (Last 24 hours) at 18 1026  Last data filed at 18 2300   Gross per 24 hour   Intake              360 ml   Output              800 ml   Net             -440 ml       Physical Exam:     Physical Exam   Constitutional: She is oriented to person, place, and time  She appears well-developed  Morbidly obese  In Acute distress   Neck: Neck supple  Cardiovascular: Normal rate, regular rhythm, normal heart sounds and intact distal pulses  Exam reveals no gallop and no friction rub  No murmur heard  Pulmonary/Chest: She is in respiratory distress  She has no wheezes  She has no rales  She exhibits no tenderness  Poor inspiratory effort; diminshed bs   Abdominal: Soft  Bowel sounds are normal  She exhibits no distension and no mass  There is no tenderness  There is no rebound and no guarding  Musculoskeletal: Normal range of motion  She exhibits no edema, tenderness or deformity  Neurological: She is alert and oriented to person, place, and time  She has normal reflexes  No cranial nerve deficit  She exhibits normal muscle tone   Coordination normal        Additional Data:     Labs:      Results from last 7 days  Lab Units 18  0440   WBC Thousand/uL 6 62   HEMOGLOBIN g/dL 13 1   HEMATOCRIT % 39 8   PLATELETS Thousands/uL 124*   NEUTROS PCT % 59   LYMPHS PCT % 25   MONOS PCT % 12   EOS PCT % 2       Results from last 7 days  Lab Units 07/08/18  0440  07/06/18 1919   SODIUM mmol/L 138  < > 138   POTASSIUM mmol/L 3 4*  < > 3 9   CHLORIDE mmol/L 104  < > 102   CO2 mmol/L 24  < > 24   BUN mg/dL 52*  < > 47*   CREATININE mg/dL 1 87*  < > 1 81*   CALCIUM mg/dL 8 5  < > 9 0   TOTAL PROTEIN g/dL  --   --  7 9   BILIRUBIN TOTAL mg/dL  --   --  0 60   ALK PHOS U/L  --   --  103   ALT U/L  --   --  32   AST U/L  --   --  29   GLUCOSE RANDOM mg/dL 118  < > 101   < > = values in this interval not displayed  Results from last 7 days  Lab Units 07/06/18 1919   INR  1 07       * I Have Reviewed All Lab Data Listed Above  * Additional Pertinent Lab Tests Reviewed: All Labs Within Last 24 Hours Reviewed    Imaging:    Imaging Reports Reviewed Today Include:   Imaging Personally Reviewed by Myself Includes:    Recent Cultures (last 7 days):           Last 24 Hours Medication List:     Current Facility-Administered Medications:  acetaminophen 650 mg Oral Q6H PRN Arthur Fortune, PA-C   aspirin 81 mg Oral Daily Arthur Fortune, PA-C   atorvastatin 20 mg Oral Daily With [employer] , PA-C   cholecalciferol 1,000 Units Oral Daily Arthur Fortune, PA-C   fish oil 1,000 mg Oral BID Arthur Fortune, PA-C   furosemide 40 mg Oral BID (diuretic) Ruchi Carrion DO   heparin (porcine) 5,000 Units Subcutaneous [facility] [address] Arthur Fortune, PA-C   metoprolol succinate 100 mg Oral Daily Arthur Fortune, PA-C   ondansetron 4 mg Intravenous Q6H PRN Arthur Fortune, PA-C   potassium chloride 40 mEq Oral Daily Ruchi Carrion DO        Today, Patient Was Seen By: Ruchi Carrion DO    ** Please Note: This note has been constructed using a voice recognition system   **

## 2018-07-08 NOTE — CASE MANAGEMENT
Initial Clinical Review    Admission: Date/Time/Statement: 7/6/18 @ 2023     Orders Placed This Encounter   Procedures    Inpatient Admission (expected length of stay for this patient is greater than two midnights)     Standing Status:   Standing     Number of Occurrences:   1     Order Specific Question:   Admitting Physician     Answer:   Jose Rafael Gaitan [[id number]]     Order Specific Question:   Level of Care     Answer:   Med Surg [16]     Order Specific Question:   Estimated length of stay     Answer:   More than 2 Midnights     Order Specific Question:   Certification     Answer:   I certify that inpatient services are medically necessary for this patient for a duration of greater than two midnights  See H&P and MD Progress Notes for additional information about the patient's course of treatment  ED: Date/Time/Mode of Arrival:   ED Arrival Information     Expected Arrival Acuity Means of Arrival Escorted By Service Admission Type    - 7/6/2018 19:00 Urgent Walk-In Family Member Anesthesiology Urgent    Arrival Complaint    -          Chief Complaint:   Chief Complaint   Patient presents with    Shortness of Breath     Pt brought via EMS for evaluation of SOB and b/l leg swelling x 1 week   Leg Swelling       History of Illness: Marcie Miller is a 69 y o  female who presents with complaints of shortness of breath  Patient states that she has a history of occasional shortness of breath but that a increased today  Patient states that she has significant increased shortness of breath with exertional activities  Patient denies chest pain, fever, nausea, vomiting, abdominal pain  Ariel Glass     ED Vital Signs:   ED Triage Vitals [07/06/18 1904]   Temperature Pulse Respirations Blood Pressure SpO2   97 8 °F (36 6 °C) 93 20 98/59 97 %      Temp Source Heart Rate Source Patient Position - Orthostatic VS BP Location FiO2 (%)   Oral Monitor Lying Right arm --      Pain Score       5        Wt Readings from Last 1 Encounters:   07/07/18 126 kg (277 lb 12 5 oz)       Vital Signs (abnormal): wnl    Abnormal Labs/Diagnostic Test Results: trop   0 29, BNP  5380  BUN creat   47  1 81  CXR -wnl   EKG- NSR     ED Treatment:   Medication Administration from 07/06/2018 1900 to 07/06/2018 2135       Date/Time Order Dose Route Action Action by Comments     07/06/2018 2006 furosemide (LASIX) injection 80 mg 80 mg Intravenous Given Jazmyn Fuentes RN           Past Medical/Surgical History:    Active Ambulatory Problems     Diagnosis Date Noted    Hypertension 02/27/2018    Hyperlipidemia 02/27/2018    Mild pulmonary hypertension ([facility] ) 02/27/2018    Lower extremity edema 02/27/2018    Left ventricular hypertrophy 02/27/2018    Arthropathy 01/16/2014    Colonic polyp 04/18/2016    Elevated creatine kinase 10/10/2016    Left knee pain 10/10/2016    Leukopenia 11/19/2014    Obesity 01/16/2014    Paroxysmal supraventricular tachycardia ([facility] ) 01/16/2014    Sciatica 05/14/2015    Thrombocytopenia ([address] ) 11/19/2014    Goiter 01/16/2014     Resolved Ambulatory Problems     Diagnosis Date Noted    No Resolved Ambulatory Problems     Past Medical History:   Diagnosis Date    Anemia     Arthropathy     Benign neoplasm of sigmoid colon     Hyperlipidemia     Hypertension     Lower extremity edema     Nontoxic goiter     Obesity     Paroxysmal supraventricular tachycardia (HCC)     Tachycardia     Thrombocytopenia (HCC)     Thyromegaly     Uterine leiomyoma        Admitting Diagnosis: SOB (shortness of breath) [R06 02]  NSTEMI (non-ST elevated myocardial infarction) ([facility]) [I21 4]  Congestive heart failure (CHF) ([facility]) [I50 9]    Age/Sex: 69 y o  female    Assessment/Plan:  * Acute CHF (congestive heart failure) ([facility] )   Assessment & Plan     Admit telemetry  Trend troponin  Cardiology consult  Echo pending  Lasix 60 mg IV b i d   I&O  Daily weights          Acute kidney injury ([facility] )   Assessment & Plan     Hold Diovan and HCTZ secondary to acute kidney injury  Recheck BMP          NSTEMI (non-ST elevated myocardial infarction) [facility])   Assessment & Plan     Monitor on telemetry  Trend troponin  Cardiology consult pending          Hypertension   Assessment & Plan     Continue metoprolol           VTE Prophylaxis: Heparin  / sequential compression device   Code Status:  Full  POLST: There is no POLST form on file for this patient (pre-hospital)  Discussion with family:  Family is present for the entire discussion      Anticipated Length of Stay:  Patient will be admitted on an Inpatient basis with an anticipated length of stay of  more than 2 midnights     Justification for Hospital Stay:  Diuresis         Admission Orders:  Scheduled Meds:   Current Facility-Administered Medications:  fentanyl citrate (PF)        acetaminophen 650 mg Oral Q6H PRN Shantel Talavera PA-C    albumin human        alteplase (ACTIVASE) IV infusion (PE) 100 mg Intravenous Once EUGENE Vogel Last Rate: 100 mg (07/08/18 1216)   atorvastatin 20 mg Oral Daily With Dinner Shantel Talavera PA-C    cholecalciferol 1,000 Units Oral Daily Shantel Talavera PA-C    epinephrine 1-20 mcg/min Intravenous Titrated Pratik Mosley MD Last Rate: 10 mcg/min (07/08/18 1257)   lactated ringers 1,000 mL Intravenous Once EUGENE Vogel Last Rate: 1,000 mL (07/08/18 1244)   multi-electrolyte 125 mL/hr Intravenous Continuous SABRA VogelNP Last Rate: 125 mL/hr (07/08/18 1303)   norepinephrine 1-30 mcg/min Intravenous Titrated Pratik Mosley MD Last Rate: 30 mcg/min (07/08/18 1305)   ondansetron 4 mg Intravenous Q6H PRN Shantel Talavera PA-C    vasopressin (PITRESSIN) in 0 9 % sodium chloride 100 mL 0 04 Units/min Intravenous Continuous Tinastan Bhakta CRNP Last Rate: 0 04 Units/min (07/08/18 1248)     Continuous Infusions:   epinephrine 1-20 mcg/min Last Rate: 10 mcg/min (07/08/18 1257)   multi-electrolyte 125 mL/hr Last Rate: 125 mL/hr (07/08/18 1303)   norepinephrine 1-30 mcg/min Last Rate: 30 mcg/min (07/08/18 1305)   vasopressin (PITRESSIN) in 0 9 % sodium chloride 100 mL 0 04 Units/min Last Rate: 0 04 Units/min (07/08/18 1248)     PRN Meds: acetaminophen    ondansetron     NPO   Cardiac diet   Nephrology , cardiology consult   Tele   SCD  Daily weight   I&O   Up w/ assist   EKG prn cp   7/8 ABG , cmp, cbc    CO2 21 - 32 mmol/L 20   L    Anion Gap 4 - 13 mmol/L 19   H    BUN 5 - 25 mg/dL 49   H    Creatinine 0 60 - 1 30 mg/dL 2 70   H    Comment: Standardized to IDMS reference method   Glucose 65 - 140 mg/dL 379   H      Calcium 8 3 - 10 1 mg/dL 12 0   H    AST 5 - 45 U/L 232   H    Comment:    Specimen collection should occur prior to Sulfasalazine administration due to the potential for falsely depressed results  ALT 12 - 78 U/L 217   H    Alb  2 9  Wbc  10 64, ptt 43, pt inr  19 9   1 71  Lactic acid  15  0     BNP  5956      Cardiology consult 7/7  Assessment/Plan:  1  Acute on chronic diastolic CHF:  -Volume overloaded on exam although improved  Likely secondary to medication noncompliance  -BNP 5380  -CXR unremarkable  -ECHO shows EF 55-65%, no regional wall motion abnormalities, G1DD  -Volume status improved  Will switch to PO lasix  -Cr 1 89  Will consult nephrology  -Low salt diet, daily weights, I/O    2  NSTEMI likely type 2: Serial troponins 0 29, 0 29, 0 24  Trend flat  In the setting of CHF exacerbation    2  HTN: Stable, continue present regimen    3  HLD: Continue statin    Nephrology consult 7/7  1  DEBO on top of CKD stage 3  Multifactorial and suspected due to underlying cardiorenal syndrome    Upon review of old medical record patient's previously known baseline creatinine was around 1 1-1 2  Patient's current creatinine is 1 89  Patient was admitted with shortness of breath and is currently being treated for acute congestive heart failure  Plan to check urine lytes to further evaluate the etiology of DEBO    Check frequent bladder scan to rule out urinary retention  Patient has received 80 mg of IV Lasix on admission and currently on Lasix 40 mg p o  BID which I will plan to continue for time being  Plan to recheck renal function along with BNP in a fior Barriga Plan to hold off valsartan for time being    2   Hypertension in chronic kidney disease  Currently blood pressure is controlled and at goal   Diovan and HCTZ is on hold  Continue to monitor hypertension with metoprolol  mg p o  Daily  Cardiology note  7/8  Assessment/Plan:  1  Acute hypoxic respiratory failure:  -STAT bedside echo reveals acute RV failure, RV enlargement suspicious for acute saddle embolus  -recommend tPA   2   Acute on chronic diastolic Congestive heart failure:  -volume status improved  Likely secondary to medication noncompliance  -BNP 5380  CXR unremarkable  -EF 55-65%  Continue p o  Lasix  -creatinine 1 87  Nephrology following   -low-salt diet, daily weights, I/O   3   NSTEMI likely type 2:  Serial troponin 0 29, 0 29, 0 24  Trend is flat  In the setting of Congestive heart failure exacerbation    4   Hypertension: continue present regimen    5   Hyperlipidemia:  Continue statin      IM note  7/8   Assessment:   Principal Problem:    Acute CHF (congestive heart failure) ([facility])  Active Problems:    Hypertension    NSTEMI (non-ST elevated myocardial infarction) ([facility] )    Acute kidney injury ([facility] )    Hypertensive kidney disease with stage 3 chronic kidney disease  Plan:   # Acute hypoxic respiratory failure; cardiopulm arrest  - s/p emergent echo at bedside per cardiology highly suspicious for acute PE given RV failure  - TPA will be initiated  - on vent support  - Contd care per ICU team  - Family made aware and updated    VTE Pharmacologic Prophylaxis:   Pharmacologic: Heparin  Mechanical VTE Prophylaxis in Place: Yes   Patient Centered Rounds: I have performed bedside rounds with nursing staff today    Discussions with Specialists or Other Care Team Provider: ICU, critical care   Education and Discussions with Family / Patient:    Time Spent for Care: 30 minutes  More than 50% of total time spent on counseling and coordination of care as described above    Current Length of Stay: 2 day(s)   Current Patient Status: Inpatient   Certification Statement: The patient will continue to require additional inpatient hospital stay due to Acute PE, cardiopulm arrest  [address] / Estimated Discharge Date:   TBd     Nephrology  Note 7/8   1  DEBO on top of CKD stage 3  Multifactorial, urine lytes were prerenal in nature and DEBO was suspected most likely due to underlying cardiorenal syndrome    Upon review of old medical records patient's baseline creatinine was around 1 1-1 2  Overnight patient's creatinine has remained elevated at 1 87  Since urine lytes were prerenal in nature and clinically patient is also in acute congestive heart failure, will plan to continue Lasix which has been managed by Cardiology service  Patient did not show signs of urinary retention and will plan to stop serial bladder scan  Follow 1 5 L of fluid restriction  Will continue monitor renal function while in the hospital   [facility] cardiologist note and patient was found to have acute RV failures with possible RV enlargement due to acute saddle embolus and was recommended to have tPA      2   Hypertension in chronic kidney disease  Currently Diovan/HCTZ is on hold  Noticed to have low blood pressure yesterday and did not receive metoprolol    Overnight blood pressure has improved and we will plan to monitor hypertension with metoprolol  mg PO daily with holding parameters

## 2018-07-08 NOTE — RESPIRATORY THERAPY NOTE
RT Ventilator Management Note  Marcie Miller 69 y o  female MRN: [id number]  Unit/Bed#:  Encounter: [id number]      Daily Screen       7/8/2018 1131             Patient safety screen outcome[de-identified] Failed    Not Ready for Weaning due to[de-identified] Hemodynamically unstable;FiO2 >60%            Physical Exam:   Assessment Type: Assess only  General Appearance: Unresponsive  Respiratory Pattern: Assisted  Chest Assessment: Chest expansion symmetrical  Bilateral Breath Sounds: Clear, Diminished  Suction: ET Tube      Resp Comments: pt remains unresponsive and on full mechanical support

## 2018-07-08 NOTE — TREATMENT PLAN
Patient now comfort care at the request of the family  Patient terminally extubated       Sandor Shelton MD

## 2018-07-08 NOTE — TREATMENT PLAN
Ongoing goals of care discussions with patient's son and daughter  Patient now DNR       Azam Grady MD

## 2018-07-08 NOTE — PROCEDURES
Intubation  Date/Time: 7/8/2018 11:10 AM  Performed by: Vince Baez  Authorized by: Vince Baez     Patient location:  Bedside  Other Assisting Provider: Yes (comment) (Dr Sabino Sanches)    Consent:     Consent obtained:  Emergent situation  Universal protocol:     Patient identity confirmed: Anonymous protocol, patient vented/unresponsive  Pre-procedure details:     Patient status:  Unresponsive    Paralytics:  Succinylcholine  Indications:     Indications for intubation: respiratory failure    Procedure details:     Preoxygenation:  Bag valve mask    CPR in progress: yes      Intubation method:  Oral    Oral intubation technique:  Glidescope (Kumar)    Laryngoscope blade: Mac 3    Tube size (mm):  7 5    Tube type:  Cuffed    Number of attempts:  1    Tube visualized through cords: yes    Placement assessment:     ETT to lip:  23    Tube secured with:  ETT gunter    Breath sounds:  Equal    Placement verification: chest rise, condensation, direct visualization and ETCO2 detector    Post-procedure details:     Patient tolerance of procedure:   Tolerated well, no immediate complications

## 2018-07-08 NOTE — RESPIRATORY THERAPY NOTE
RT Ventilator Management Note  Nathanael Olguin 69 y o  female MRN: [id number]  Unit/Bed#:  Encounter: [id number]      Daily Screen       7/8/2018 1131             Patient safety screen outcome[de-identified] Failed    Not Ready for Weaning due to[de-identified] Hemodynamically unstable;FiO2 >60%            Physical Exam:   Assessment Type: Assess only  General Appearance: Unresponsive  Respiratory Pattern: Assisted  Chest Assessment: Chest expansion symmetrical  Bilateral Breath Sounds: Clear, Diminished      Resp Comments: pt went into resp distress while a pt on MS2  Pt was placed on a non rebrether and then bipap  Pt became increasingly more dyspenic while on bipap  While transporting to the ICU pt became unresponsive and was intubated in the icu and placed on full mechanical support    Current settings 450/12/+5/100

## 2018-07-08 NOTE — PROCEDURES
Arterial Line Insertion  Date/Time: 7/8/2018 11:10 AM  Performed by: Irasema Cesar by: Eric Paci     Patient location:  Bedside  Consent:     Consent obtained:  Emergent situation  Universal protocol:     Procedure explained and questions answered to patient or proxy's satisfaction: no      Relevant documents present and verified: no      Test results available and properly labeled: no      Radiology Images displayed and confirmed  If images not available, report reviewed: no      Required blood products, implants, devices, and special equipment available: no      Site/side marked: no      Immediately prior to procedure a time out was called: no      Patient identity confirmed:  Hospital-assigned identification number  Indications:     Indications: hemodynamic monitoring, multiple ABGs, continuous blood pressure monitoring and frequent labs / infusion    Pre-procedure details:     Skin preparation:  Chlorhexidine  Procedure details:     Location / Tip of Catheter:  Femoral    Laterality:  Right    Wilfredo's test performed: no      Needle gauge: 5 [place], 15 cm  Placement technique:  Seldinger    Number of attempts:  1    Successful placement: yes      Transducer: waveform confirmed    Post-procedure details:     Post-procedure:  Secured with tape, sutured and sterile dressing applied    CMS:  Unchanged    Patient tolerance of procedure:   Tolerated well, no immediate complications

## 2018-07-08 NOTE — PROGRESS NOTES
General Cardiology   Progress Note   More Ayala 69 y o  female MRN: [id number]  Unit/Bed#: -01 Encounter: [id number]        Subjective:   Initially this morning patient states that SOB was improved, however had episode of acute hypoxia today  Was walking to the bathroom when she suddenly became dyspneic and diaphoretic  Was taken urgently to ICU and subsequently became unresponsive  Patient was intubated, coded, underwent ACLS protocol, pulse was recovered, bedside ECHO was done STAT  Bedside ECHO revealed acute RV failure and enlarged RV suspicious for pulmonary embolism  Objective:   Vitals:  Vitals:    07/08/18 0700   BP: 110/60   Pulse: 102   Resp: 20   Temp: 97 8 °F (36 6 °C)   SpO2: 95%       Body mass index is 44 83 kg/m²  Systolic (64MNS), PSB:060 , Min:94 , EOM:027     Diastolic (01NDQ), EVC:64, Min:57, Max:70      Intake/Output Summary (Last 24 hours) at 07/08/18 0928  Last data filed at 07/07/18 2300   Gross per 24 hour   Intake              360 ml   Output              800 ml   Net             -440 ml     Weight (last 2 days)     Date/Time   Weight    07/07/18 0600  126 (277 78)    07/06/18 2138  126 (277 78)              PHYSICAL EXAMS:  General:  +intubated, +unresponsive, +obese  Head: Normocephalic, Atraumatic  HEENT: White sclera, pink conjunctiva,  PERRLA,pharynx benign  Neck:  Supple, no neck vein distention, carotids+2/+2 no bruits, thyromegaly, adenopathy  Respiratory: clear to P/A  Cardiovascular:  PMI normal, S1-S2 normal, No  Murmurs, thrills, gallops, rubs   Regular rhythm  GI:  Abdomen soft nontender   No hepatosplenomegaly, adenopathy, ascites,or rebound tenderness  Extremities: No edema, normal pulses, no calf tenderness, no joint deformities, no venous disease   Integument:  No skin rashes or ulceration  Lymphatic:  No cervical or inguinal lymphadenopathy  Neurologic:  +intubated, +unresponsive    LABORATORY RESULTS:    Results from last 7 days  Lab Units 07/07/18 0028 07/06/18 2244 07/06/18 1919   TROPONIN I ng/mL 0 24* 0 29* 0 29*     CBC with diff:   Results from last 7 days  Lab Units 07/08/18 0440 07/07/18 0436 07/06/18 2244   WBC Thousand/uL 6 62 7 45  --    HEMOGLOBIN g/dL 13 1 13 1  --    HEMATOCRIT % 39 8 39 9  --    MCV fL 86 85  --    PLATELETS Thousands/uL 124* 146* 152   MCH pg 28 3 27 9  --    MCHC g/dL 32 9 32 8  --    RDW % 14 5 14 4  --    MPV fL 12 3 12 6 12 1   NRBC AUTO /100 WBCs 0 0  --        CMP:  Results from last 7 days  Lab Units 07/08/18 0440 07/06/18 2244 07/06/18 1919   SODIUM mmol/L 138 143 138   POTASSIUM mmol/L 3 4* 3 5 3 9   CHLORIDE mmol/L 104 104 102   CO2 mmol/L 24 26 24   ANION GAP mmol/L 10 13 12   BUN mg/dL 52* 47* 47*   CREATININE mg/dL 1 87* 1 89* 1 81*   GLUCOSE RANDOM mg/dL 118 122 101   CALCIUM mg/dL 8 5 9 0 9 0   AST U/L  --   --  29   ALT U/L  --   --  32   ALK PHOS U/L  --   --  103   TOTAL PROTEIN g/dL  --   --  7 9   BILIRUBIN TOTAL mg/dL  --   --  0 60   EGFR ml/min/1 73sq m 26 25 27       BMP:  Results from last 7 days  Lab Units 07/08/18 0440 07/06/18 2244 07/06/18 1919   SODIUM mmol/L 138 143 138   POTASSIUM mmol/L 3 4* 3 5 3 9   CHLORIDE mmol/L 104 104 102   CO2 mmol/L 24 26 24   BUN mg/dL 52* 47* 47*   CREATININE mg/dL 1 87* 1 89* 1 81*   GLUCOSE RANDOM mg/dL 118 122 101   CALCIUM mg/dL 8 5 9 0 9 0           Results from last 7 days  Lab Units 07/08/18 0440 07/06/18 1919   NT-PRO BNP pg/mL 5,956* 5,380*                    Results from last 7 days  Lab Units 07/06/18 1919   INR  1 07       Lipid Profile:   Lab Results   Component Value Date    CHOL 148 01/15/2018    CHOL 155 08/23/2017    CHOL 254 (H) 05/25/2017     Lab Results   Component Value Date    HDL 70 (H) 01/15/2018    HDL 63 (H) 08/23/2017    HDL 69 (H) 05/25/2017     Lab Results   Component Value Date    LDLCALC 69 01/15/2018    LDLCALC 84 08/23/2017    LDLCALC 172 (H) 05/25/2017     Lab Results   Component Value Date    TRIG 46 01/15/2018    TRIG 40 2017    TRIG 66 2017       Cardiac testing:   Results for orders placed during the hospital encounter of 18   Echo complete with contrast if indicated    Narrative [address], [address] [facility]  [phone]    Transthoracic Echocardiogram  Limited 2D, M-mode, Doppler, and Color Doppler    Study date:  2018    Patient: Juana Kebede  MR number: [id number]  Account number: [de-identified]  : 1940  Age: 69 years  Gender: Female  Status: Outpatient  Location: [facility]  Height: 66 in  Weight: 280 lb  BP: 116/ 52 mmHg    Indications: Hypertensive heart disease  Diagnoses: I10  - Essential (primary) hypertension    Sonographer:  Colmenares RCS  Interpreting Physician:  David Crow MD  Primary Physician:  Rhona Craig MD  Referring Physician:  David Crow MD  Group:  [facility]    SUMMARY    LEFT VENTRICLE:  Systolic function was normal  Ejection fraction was estimated in the range of 55 % to 65 %  There were no regional wall motion abnormalities  Doppler parameters were consistent with abnormal left ventricular relaxation (grade 1 diastolic dysfunction)  MITRAL VALVE:  There was mild annular calcification  AORTIC VALVE:  The valve was trileaflet  Leaflets exhibited normal thickness and normal cuspal separation  There was mild regurgitation  HISTORY: PRIOR HISTORY: Pulmonary HTN  SVT  Risk factors: medication-treated hypercholesterolemia and morbid obesity  PROCEDURE: The study was performed in the [address] [place] [address]  This was a routine study  The transthoracic approach was used  The study included limited 2D imaging, M-mode, complete spectral Doppler, and color Doppler  The  heart rate was 64 bpm, at the start of the study  Images were obtained from the parasternal, apical, subcostal, and suprasternal notch acoustic windows   This was a technically difficult study  LEFT VENTRICLE: Size was normal  Systolic function was normal  Ejection fraction was estimated in the range of 55 % to 65 %  There were no regional wall motion abnormalities  Wall thickness was normal  DOPPLER: Doppler parameters were  consistent with abnormal left ventricular relaxation (grade 1 diastolic dysfunction)  RIGHT VENTRICLE: The size was normal  Systolic function was normal  Wall thickness was normal     LEFT ATRIUM: Size was normal     RIGHT ATRIUM: Size was normal     MITRAL VALVE: There was mild annular calcification  Valve structure was normal  There was normal leaflet separation  DOPPLER: The transmitral velocity was within the normal range  There was no evidence for stenosis  There was no  regurgitation  AORTIC VALVE: The valve was trileaflet  Leaflets exhibited normal thickness and normal cuspal separation  The valve was not well visualized  DOPPLER: Transaortic velocity was within the normal range  There was no evidence for stenosis  There was mild regurgitation  TRICUSPID VALVE: The valve structure was normal  There was normal leaflet separation  DOPPLER: The transtricuspid velocity was within the normal range  There was no evidence for stenosis  There was no regurgitation  PULMONIC VALVE: Leaflets exhibited normal thickness, no calcification, and normal cuspal separation  DOPPLER: The transpulmonic velocity was within the normal range  There was no regurgitation  PERICARDIUM: There was no pericardial effusion  The pericardium was normal in appearance  AORTA: The root exhibited normal size  SYSTEMIC VEINS: IVC: The inferior vena cava was normal in size and course  Respirophasic changes were normal     SYSTEM MEASUREMENT TABLES    Apical four chamber  4 chamber Left Atrium Volume Index; Planimetry; End Systole; Apical four chamber;: 14 6 cm2  Left Ventricular Ejection Fraction; Method of Disks, Single Plane;  Apical four chamber;: 69 8 %  Left Ventricular End Diastolic Volume; Method of Disks, Single Plane; Apical four chamber;: 83 9 ml  Left Ventricular End Systolic Volume; Method of Disks, Single Plane; Apical four chamber;: 25 3 ml  Right Atrium Systolic Area; Planimetry; End Systole; Apical four chamber;: 14 86 cm2  Right Ventricular Internal Diastolic Dimension; End Diastole; Apical four chamber;: 34 1 mm  TAPSE: 24 3 mm    Unspecified Scan Mode  AR Vmax; Regurgitant Flow; Diastole;: 423 2 cm/s  Gradient Pressure, Peak; Simplified Bernoulli; Antegrade Flow; Systole;: 9 4 mm[Hg]  Gradient pressure, average; Simplified Bernoulli;  Antegrade Flow; Systole;: 4 8 mm[Hg]  Pressure Half Time;: 0 69 s  Mitral Valve Area; Area by Pressure Half-Time; Systole;: 3 24 cm2  Mitral Valve E to A Ratio; Systole;: 0 68  Maximum Tricuspid valve regurgitation pressure gradient; Regurgitant Flow; Systole;: 25 3 mm[Hg]    [facility]    Prepared and electronically signed by    Sommer Andrews MD  Signed 05-Apr-2018 18:20:49         Meds/Allergies   all current active meds have been reviewed and current meds:   Current Facility-Administered Medications   Medication Dose Route Frequency    acetaminophen (TYLENOL) tablet 650 mg  650 mg Oral Q6H PRN    albumin human (FLEXBUMIN) 5 % injection 25 g  25 g Intravenous Once    alteplase (ACTIVASE) 100 mg in sterile water 100 mL infusion  100 mg Intravenous Once    aspirin (ECOTRIN LOW STRENGTH) EC tablet 81 mg  81 mg Oral Daily    atorvastatin (LIPITOR) tablet 20 mg  20 mg Oral Daily With Dinner    cholecalciferol (VITAMIN D3) tablet 1,000 Units  1,000 Units Oral Daily    EPINEPHrine 6000 mcg (DOUBLE CONCENTRATION) IV in sodium chloride 0 9% 250 mL  1-20 mcg/min Intravenous Titrated    fish oil capsule 1,000 mg  1,000 mg Oral BID    furosemide (LASIX) injection 80 mg  80 mg Intravenous Once    furosemide (LASIX) tablet 40 mg  40 mg Oral BID (diuretic)    heparin (porcine) subcutaneous injection 5,000 Units  5,000 Units Subcutaneous Q8H [facility] & long-term    metoprolol succinate (TOPROL-XL) 24 hr tablet 100 mg  100 mg Oral Daily    morphine injection 2 mg  2 mg Intravenous Once    norepinephrine (LEVOPHED) 8 mg (DOUBLE CONCENTRATION) IV in sodium chloride 0 9% 250 mL  1-30 mcg/min Intravenous Titrated    ondansetron (ZOFRAN) injection 4 mg  4 mg Intravenous Q6H PRN    potassium chloride (K-DUR,KLOR-CON) CR tablet 40 mEq  40 mEq Oral Daily     Prescriptions Prior to Admission   Medication    aspirin (ECOTRIN LOW STRENGTH) 81 mg EC tablet    atorvastatin (LIPITOR) 20 mg tablet    calcium citrate-vitamin D (CITRACAL+D) 315-200 MG-UNIT per tablet    cholecalciferol (VITAMIN D3) 1,000 units tablet    furosemide (LASIX) 40 mg tablet    meloxicam (MOBIC) 7 5 mg tablet    metoprolol succinate (TOPROL-XL) 100 mg 24 hr tablet    Multiple Vitamin (MULTIVITAMIN) tablet    Omega-3 Fatty Acids (FISH OIL) 1,000 mg    valsartan-hydrochlorothiazide (DIOVAN HCT) 80-12 5 MG per tablet         Assessment/Plan:  1  Acute hypoxic respiratory failure:  -STAT bedside echo reveals acute RV failure, RV enlargement suspicious for acute saddle embolus  -recommend tPA    2  Acute on chronic diastolic Congestive heart failure:  -volume status improved  Likely secondary to medication noncompliance  -BNP 5380  CXR unremarkable  -EF 55-65%  Continue p o  Lasix  -creatinine 1 87  Nephrology following   -low-salt diet, daily weights, I/O    3   NSTEMI likely type 2:  Serial troponin 0 29, 0 29, 0 24  Trend is flat  In the setting of Congestive heart failure exacerbation  4   Hypertension: continue present regimen  5   Hyperlipidemia:  Continue statin  ** Please Note: Dragon 360 Dictation voice to text software may have been used in the creation of this document   **

## 2018-07-08 NOTE — NURSING NOTE
At about 09:45 this morning, patient requested to be washed up and she was assisted to the bathroom  She was assisted back to bed ,and  sitting at the edge of the bed, K-clor 40 meq was administered  While she was being helped back into bed, she stated that she was having difficulty breathing  Vital signs were done O2 was in the 80's  Non- re breather was applied and Dr Lori Starr [facility]) was called into the room  She ordered IV Lasix 80mg STAT, which was administered and ordered the ICU Team called  Patient was transferred to the ICU on BIPAP

## 2018-07-08 NOTE — PROGRESS NOTES
NEPHROLOGY PROGRESS NOTE    Patient: Marcie Miller               Sex: female          DOA: 7/6/2018  7:01 PM   YOB: 1940        Age:  69 y o         LOS:  LOS: 2 days   7/8/2018    REASON FOR THE CONSULTATION:  Further management of DEBO    HPI     This is a 69 y o  female admitted for Acute CHF (congestive heart failure) ([facility] [address] )     SUBJECTIVE     - patient was seen earlier this morning overnight breathing has remained stable but continue to complain of shortness of breath upon minimal activity  Found to be nonoliguric overnight  Patient denies nausea / vomiting / headache / chest pain today    - Reviewed last 24 hrs events     CURRENT MEDICATIONS       Current Facility-Administered Medications:     acetaminophen (TYLENOL) tablet 650 mg, 650 mg, Oral, Q6H PRN, Juan Miguel Dale PA-C    albumin human (FLEXBUMIN) 5 % injection 25 g, 25 g, Intravenous, Once, EUGENE Vogel    alteplase (ACTIVASE) 100 mg in sterile water 100 mL infusion, 100 mg, Intravenous, Once, EUGENE Vogel    atorvastatin (LIPITOR) tablet 20 mg, 20 mg, Oral, Daily With Dinner, LESTER Hoyt-C, 20 mg at 07/07/18 1743    cholecalciferol (VITAMIN D3) tablet 1,000 Units, 1,000 Units, Oral, Daily, Dastefano Dale PA-C, 1,000 Units at 07/08/18 0842    EPINEPHrine 6000 mcg (DOUBLE CONCENTRATION) IV in sodium chloride 0 9% 250 mL, 1-20 mcg/min, Intravenous, Titrated, Leann Cotton MD    furosemide (LASIX) injection 80 mg, 80 mg, Intravenous, Once, Bhargavi Christiansen DO    morphine injection 2 mg, 2 mg, Intravenous, Once, EUEGNE Vogel    norepinephrine (LEVOPHED) 8 mg (DOUBLE CONCENTRATION) IV in sodium chloride 0 9% 250 mL, 1-30 mcg/min, Intravenous, Titrated, Leann Cotton MD    [facility]) injection 4 mg, 4 mg, Intravenous, Q6H PRN, Juan Miguel aDle PA-C    OBJECTIVE     Current Weight: Weight - Scale: 126 kg (277 lb 12 5 oz)  Vitals:    07/08/18 1131   BP:    Pulse:    Resp:    Temp:    SpO2: (!) 89%     Body mass index is 44 83 kg/m²  Intake/Output Summary (Last 24 hours) at 07/08/18 1147  Last data filed at 07/07/18 2300   Gross per 24 hour   Intake              360 ml   Output              800 ml   Net             -440 ml       PHYSICAL EXAMINATION     Physical Exam   Constitutional: No distress  Obese   HENT:   Head: Normocephalic and atraumatic  Eyes: Conjunctivae are normal  No scleral icterus  Right eye exhibits normal extraocular motion  Left eye exhibits normal extraocular motion  Neck: Neck supple  No JVD present  Cardiovascular: Normal rate, S1 normal and S2 normal     Pulmonary/Chest: No accessory muscle usage  No respiratory distress  She has no wheezes  Abdominal: Soft  She exhibits no distension  Musculoskeletal:        Right ankle: She exhibits swelling  Left ankle: She exhibits swelling  Right hand: She exhibits no laceration  Left hand: She exhibits no laceration  Lymphadenopathy:        Right cervical: No superficial cervical adenopathy present  Left cervical: No superficial cervical adenopathy present  Right: No supraclavicular adenopathy present  Left: No supraclavicular adenopathy present  Neurological: She is alert  She is not disoriented  Skin: Skin is warm  No laceration noted  No cyanosis  Psychiatric: She is not combative  She does not exhibit a depressed mood  She expresses no suicidal ideation           LAB RESULTS       Results from last 7 days  Lab Units 07/08/18  0440 07/07/18  0436 07/06/18  2244 07/06/18  1919   WBC Thousand/uL 6 62 7 45  --   --    HEMOGLOBIN g/dL 13 1 13 1  --   --    HEMATOCRIT % 39 8 39 9  --   --    PLATELETS Thousands/uL 124* 146* 152  --    SODIUM mmol/L 138  --  143 138   POTASSIUM mmol/L 3 4*  --  3 5 3 9   CHLORIDE mmol/L 104  --  104 102   CO2 mmol/L 24  --  26 24   BUN mg/dL 52*  --  47* 47*   CREATININE mg/dL 1 87*  --  1 89* 1 81*   EGFR ml/min/1 73sq m 26  --  25 27   CALCIUM mg/dL 8 5  --  9 0 9 0   TOTAL PROTEIN g/dL  --   --   --  7 9   GLUCOSE RANDOM mg/dL 118  --  122 101       I have personally reviewed the old medical records and patient's previously known baseline creatinine level is ~ 1 1-1 2    RADIOLOGY RESULTS      Results for orders placed during the hospital encounter of 07/06/18   XR chest pa & lateral    Narrative CHEST     INDICATION:   Shortness of breath, leg swelling  COMPARISON:  4/17/2007    EXAM PERFORMED/VIEWS:  XR CHEST AP & LATERAL      FINDINGS:    Cardiac silhouette is probably top normal accounting for technique with mildly uncoiled thoracic aorta  The lungs are clear  No pneumothorax or pleural effusion  Degenerative changes of the spine  Impression No acute cardiopulmonary disease  Workstation performed: [id number]         PLAN / RECOMMENDATIONS      1  DBEO on top of CKD stage 3  Multifactorial, urine lytes were prerenal in nature and DEBO was suspected most likely due to underlying cardiorenal syndrome  Upon review of old medical records patient's baseline creatinine was around 1 1-1 2  Overnight patient's creatinine has remained elevated at 1 87  Since urine lytes were prerenal in nature and clinically patient is also in acute congestive heart failure, will plan to continue Lasix which has been managed by Cardiology service  Patient did not show signs of urinary retention and will plan to stop serial bladder scan  Follow 1 5 L of fluid restriction  Will continue monitor renal function while in the hospital     Reviewed cardiologist note and patient was found to have acute RV failures with possible RV enlargement due to acute saddle embolus and was recommended to have tPA       2   Hypertension in chronic kidney disease  Currently Diovan/HCTZ is on hold  Noticed to have low blood pressure yesterday and did not receive metoprolol    Overnight blood pressure has improved and we will plan to monitor hypertension with metoprolol XL 100 mg PO daily with holding parameters  Plan was also d/w Josie Ballesteros MD  Nephrology  7/8/2018        Portions of the record may have been created with voice recognition software  Occasional wrong word or "sound a like" substitutions may have occurred due to the inherent limitations of voice recognition software  Read the chart carefully and recognize, using context, where substitutions have occurred

## 2018-07-09 ENCOUNTER — TRANSITIONAL CARE MANAGEMENT (OUTPATIENT)
Dept: INTERNAL MEDICINE CLINIC | Facility: CLINIC | Age: 78
End: 2018-07-09

## 2018-07-09 LAB
DEPRECATED AT III PPP: 78 % OF NORMAL (ref 92–136)
PROT C AG ACT/NOR PPP IA: 53 % OF NORMAL (ref 60–150)

## 2018-07-09 PROCEDURE — 93321 DOPPLER ECHO F-UP/LMTD STD: CPT | Performed by: INTERNAL MEDICINE

## 2018-07-09 PROCEDURE — 93308 TTE F-UP OR LMTD: CPT | Performed by: INTERNAL MEDICINE

## 2018-07-09 PROCEDURE — 93325 DOPPLER ECHO COLOR FLOW MAPG: CPT | Performed by: INTERNAL MEDICINE

## 2018-07-09 NOTE — DEATH NOTE
INPATIENT DEATH NOTE  Micah Lima 69 y o  female MRN: [id number]  Unit/Bed#:  Encounter: [id number]    Date, Time and Cause of Death    Date of Death:  7/8/18  Time of Death:   8:57 PM  Preliminary Cause of Death:  Right heart failure              PHYSICAL EXAM:  Unresponsive to noxious stimuli, Spontaneous respirations absent, Breath sounds absent, Carotid pulse absent, Heart sounds absent, Pupillary light reflex absent and Corneal blink reflex absent    Medical Examiner notification criteria:  NONE APPLICABLE   Medical Examiner's office notified?:  No, does not meet ME notification criteria   Medical Examiner accepted case?:   Name of Medical Examiner:          Autopsy Options:  Post-mortem examination declined by next of kin    Primary Service Attending Physician notified?:  yes - Attending:  Rilla Boast, MD    Physician/Resident responsible for completing Discharge Summary:  Maycol Riley

## 2018-07-09 NOTE — DISCHARGE SUMMARY
Discharge Summary - Kory Matthew 69 y o  female MRN: [id number]    Unit/Bed#:  Encounter: [id number] PCP: Jl Lyn MD    Admission Date:   Admission Orders     Ordered        18  Inpatient Admission (expected length of stay for this patient is greater than two midnights)  Once               Admitting Diagnosis: SOB (shortness of breath) [R06 02]  NSTEMI (non-ST elevated myocardial infarction) ([facility] [address] ) [I21 4]  Congestive heart failure (CHF) ([facility] ) [I50 9]    HPI:  60-year-old female presenting with complaints of shortness of breath, that worsened over the course of that day  Denied chest pain, fever, nausea, vomiting, abdominal pain  Procedures Performed:   Orders Placed This Encounter   Procedures   [address]    ED ECG Documentation Only    Intubation       Summary of Hospital Course:  Patient was admitted on the evening of  with acute on chronic diastolic CHF with plan for diuresis  Type 2 non STEMI diagnosed during admission  Suspected cardiorenal DEBO on CKD stage 3  In the morning of , patient was ambulating to and from the bathroom when she became acutely hypoxic  Oxygen via  100% non-rebreather improved saturations, however patient continued to deteriorate, requiring emergent intubation  Echo performed at the bedside by Cardiology was highly suspicious for acute PE given significant right ventricular strain  Immediate thrombolytic therapy for hemodynamic instability provided  Patient remained in shock requiring triple pressor therapy  Patient responding poorly to interventions  Goals of care discussion with family yielded decision to move to comfort measures as opposed to resuscitative measures  Patient was compassionately extubated, and  at 8:57 p m  this day pronounced dead by me      Significant Findings, Care, Treatment and Services Provided:  See above    Complications:  None    Disposition:      Final Diagnosis:  Right heart failure, likely related to massive pulmonary embolus    Resolved Problems  Date Reviewed: 7/6/2018    None              Date, Time and Cause of Death    Date of Death:  7/8/18  Time of Death:   8:57 PM  Preliminary Cause of Death:  Right heart failure

## 2018-07-10 LAB
PROT S ACT/NOR PPP: 81 % (ref 57–157)
PROT S ACT/NOR PPP: 82 % (ref 63–140)
PROT S PPP-ACNC: 114 % (ref 60–150)

## 2018-07-17 LAB
APTT SCREEN TO CONFIRM RATIO: 0.82 RATIO (ref 0–1.4)
CONFIRM APTT/NORMAL: 65.5 SEC (ref 0–55)
DRVVT IMM 1:2 NP PPP: 49.1 SEC (ref 0–47)
DRVVT SCREEN TO CONFIRM RATIO: 1.1 RATIO (ref 0.8–1.2)
F2 GENE MUT ANL BLD/T: NORMAL
F5 GENE MUT ANL BLD/T: NORMAL
LA PPP-IMP: ABNORMAL
SCREEN APTT: 49.2 SEC (ref 0–51.9)
SCREEN DRVVT: 70.8 SEC (ref 0–47)
THROMBIN TIME: 39.3 SEC (ref 0–23)
TT IMM NP PPP: 29.5 SEC (ref 0–23)
TT P HPASE PPP: 42.9 SEC (ref 0–23)

## 2018-08-20 ENCOUNTER — TELEPHONE (OUTPATIENT)
Dept: CARDIOLOGY CLINIC | Facility: CLINIC | Age: 78
End: 2018-08-20

## 2018-08-20 NOTE — TELEPHONE ENCOUNTER
PT'S DTR CALLED EARLIER THIS RITAN LOOKING TO GET PT'S MEDICAL RECORDS B/C SHE WANTS TO KNOW WHAT WAS PT'S DX & HER CONDITION WHEN PT PASSED IN [facility]  I SPOKE TO VITALY FROM [facility] & SHE WENT BACK AS FAR AS ZOYA & COULDN'T FIND A POA FORM FROM PT & DOESN'T FEEL COMFORTABLE RELEASING ANY RECORDS TO PT'S DTR UNLESS THERE IS A POA FORM  I CALLED PT'S DTR & SHE SAID THAT SHE DOES NOT HAVE A POA FORM BUT SHE HAS A DEATH CERT & A WILL   PT'S DTR WAS UPSET ON THE PHONE & REALLY [facility]

## 2018-08-20 NOTE — TELEPHONE ENCOUNTER
Georgiana Segovia s/w pt's daughter Mike Grossman and clarified she is to contact Medical records   Phone number was given to her #[phone]

## 2018-09-09 DIAGNOSIS — I10 ESSENTIAL HYPERTENSION: ICD-10-CM

## 2018-09-10 RX ORDER — METOPROLOL SUCCINATE 100 MG/1
TABLET, EXTENDED RELEASE ORAL
Qty: 90 TABLET | Refills: 0 | OUTPATIENT
Start: 2018-09-10

## 2022-06-08 LAB — CARDIOLIPIN IGA SER IA-ACNC: NORMAL
